# Patient Record
Sex: FEMALE | Race: WHITE | NOT HISPANIC OR LATINO | Employment: FULL TIME | URBAN - METROPOLITAN AREA
[De-identification: names, ages, dates, MRNs, and addresses within clinical notes are randomized per-mention and may not be internally consistent; named-entity substitution may affect disease eponyms.]

---

## 2023-06-23 DIAGNOSIS — F41.9 ANXIETY: ICD-10-CM

## 2023-06-23 PROBLEM — K21.9 GERD (GASTROESOPHAGEAL REFLUX DISEASE): Status: ACTIVE | Noted: 2023-06-23

## 2023-06-23 PROBLEM — E78.00 ELEVATED LDL CHOLESTEROL LEVEL: Status: ACTIVE | Noted: 2023-06-23

## 2023-06-23 PROBLEM — E55.9 VITAMIN D DEFICIENCY: Status: ACTIVE | Noted: 2023-06-23

## 2023-06-23 PROBLEM — K11.7 EXCESSIVE SALIVATION: Status: ACTIVE | Noted: 2023-06-23

## 2023-06-23 PROBLEM — M54.2 NECK PAIN: Status: ACTIVE | Noted: 2023-06-23

## 2023-06-23 PROBLEM — N39.0 ACUTE UTI: Status: ACTIVE | Noted: 2023-06-23

## 2023-06-23 RX ORDER — LEVONORGESTREL AND ETHINYL ESTRADIOL 0.1-0.02MG
1 KIT ORAL DAILY
COMMUNITY
End: 2023-10-24 | Stop reason: ALTCHOICE

## 2023-06-23 RX ORDER — ESCITALOPRAM OXALATE 10 MG/1
10 TABLET ORAL DAILY
COMMUNITY
End: 2023-06-23 | Stop reason: SDUPTHER

## 2023-06-23 RX ORDER — CYCLOBENZAPRINE HCL 5 MG
TABLET ORAL
COMMUNITY
Start: 2022-08-10 | End: 2024-03-19

## 2023-06-23 RX ORDER — CHOLECALCIFEROL (VITAMIN D3) 25 MCG
1 TABLET ORAL DAILY
COMMUNITY
Start: 2022-07-06

## 2023-06-23 RX ORDER — ESCITALOPRAM OXALATE 10 MG/1
10 TABLET ORAL DAILY
Qty: 30 TABLET | Refills: 0 | Status: SHIPPED | OUTPATIENT
Start: 2023-06-23 | End: 2023-08-10 | Stop reason: SDUPTHER

## 2023-08-09 ENCOUNTER — TELEPHONE (OUTPATIENT)
Dept: PRIMARY CARE | Facility: CLINIC | Age: 53
End: 2023-08-09
Payer: COMMERCIAL

## 2023-08-10 DIAGNOSIS — F41.9 ANXIETY: ICD-10-CM

## 2023-08-10 RX ORDER — ESCITALOPRAM OXALATE 10 MG/1
10 TABLET ORAL DAILY
Qty: 90 TABLET | Refills: 1 | Status: SHIPPED | OUTPATIENT
Start: 2023-08-10 | End: 2023-11-20 | Stop reason: SDUPTHER

## 2023-08-11 DIAGNOSIS — Z00.00 HEALTH MAINTENANCE EXAMINATION: ICD-10-CM

## 2023-10-13 ENCOUNTER — LAB (OUTPATIENT)
Dept: LAB | Facility: LAB | Age: 53
End: 2023-10-13
Payer: COMMERCIAL

## 2023-10-13 DIAGNOSIS — Z00.00 HEALTH MAINTENANCE EXAMINATION: ICD-10-CM

## 2023-10-13 PROCEDURE — 80053 COMPREHEN METABOLIC PANEL: CPT

## 2023-10-13 PROCEDURE — 80061 LIPID PANEL: CPT

## 2023-10-13 PROCEDURE — 84443 ASSAY THYROID STIM HORMONE: CPT

## 2023-10-13 PROCEDURE — 36415 COLL VENOUS BLD VENIPUNCTURE: CPT

## 2023-10-13 PROCEDURE — 85025 COMPLETE CBC W/AUTO DIFF WBC: CPT

## 2023-10-14 LAB
ALBUMIN SERPL BCP-MCNC: 4.3 G/DL (ref 3.4–5)
ALP SERPL-CCNC: 70 U/L (ref 33–110)
ALT SERPL W P-5'-P-CCNC: 16 U/L (ref 7–45)
ANION GAP SERPL CALC-SCNC: 14 MMOL/L (ref 10–20)
AST SERPL W P-5'-P-CCNC: 15 U/L (ref 9–39)
BASOPHILS # BLD AUTO: 0.09 X10*3/UL (ref 0–0.1)
BASOPHILS NFR BLD AUTO: 1.2 %
BILIRUB SERPL-MCNC: 0.4 MG/DL (ref 0–1.2)
BUN SERPL-MCNC: 11 MG/DL (ref 6–23)
CALCIUM SERPL-MCNC: 9.4 MG/DL (ref 8.6–10.6)
CHLORIDE SERPL-SCNC: 106 MMOL/L (ref 98–107)
CHOLEST SERPL-MCNC: 216 MG/DL (ref 0–199)
CHOLESTEROL/HDL RATIO: 6.3
CO2 SERPL-SCNC: 25 MMOL/L (ref 21–32)
CREAT SERPL-MCNC: 0.71 MG/DL (ref 0.5–1.05)
EOSINOPHIL # BLD AUTO: 0.21 X10*3/UL (ref 0–0.7)
EOSINOPHIL NFR BLD AUTO: 2.9 %
ERYTHROCYTE [DISTWIDTH] IN BLOOD BY AUTOMATED COUNT: 13 % (ref 11.5–14.5)
GFR SERPL CREATININE-BSD FRML MDRD: >90 ML/MIN/1.73M*2
GLUCOSE SERPL-MCNC: 88 MG/DL (ref 74–99)
HCT VFR BLD AUTO: 46.1 % (ref 36–46)
HDLC SERPL-MCNC: 34.3 MG/DL
HGB BLD-MCNC: 14.6 G/DL (ref 12–16)
IMM GRANULOCYTES # BLD AUTO: 0.02 X10*3/UL (ref 0–0.7)
IMM GRANULOCYTES NFR BLD AUTO: 0.3 % (ref 0–0.9)
LDLC SERPL CALC-MCNC: 135 MG/DL
LYMPHOCYTES # BLD AUTO: 1.54 X10*3/UL (ref 1.2–4.8)
LYMPHOCYTES NFR BLD AUTO: 21.1 %
MCH RBC QN AUTO: 29.9 PG (ref 26–34)
MCHC RBC AUTO-ENTMCNC: 31.7 G/DL (ref 32–36)
MCV RBC AUTO: 94 FL (ref 80–100)
MONOCYTES # BLD AUTO: 0.4 X10*3/UL (ref 0.1–1)
MONOCYTES NFR BLD AUTO: 5.5 %
NEUTROPHILS # BLD AUTO: 5.05 X10*3/UL (ref 1.2–7.7)
NEUTROPHILS NFR BLD AUTO: 69 %
NON HDL CHOLESTEROL: 182 MG/DL (ref 0–149)
NRBC BLD-RTO: 0 /100 WBCS (ref 0–0)
PLATELET # BLD AUTO: 395 X10*3/UL (ref 150–450)
PMV BLD AUTO: 10.2 FL (ref 7.5–11.5)
POTASSIUM SERPL-SCNC: 4.4 MMOL/L (ref 3.5–5.3)
PROT SERPL-MCNC: 7.1 G/DL (ref 6.4–8.2)
RBC # BLD AUTO: 4.89 X10*6/UL (ref 4–5.2)
SODIUM SERPL-SCNC: 141 MMOL/L (ref 136–145)
TRIGL SERPL-MCNC: 236 MG/DL (ref 0–149)
TSH SERPL-ACNC: 0.77 MIU/L (ref 0.44–3.98)
VLDL: 47 MG/DL (ref 0–40)
WBC # BLD AUTO: 7.3 X10*3/UL (ref 4.4–11.3)

## 2023-10-24 ENCOUNTER — OFFICE VISIT (OUTPATIENT)
Dept: PRIMARY CARE | Facility: CLINIC | Age: 53
End: 2023-10-24
Payer: COMMERCIAL

## 2023-10-24 VITALS
SYSTOLIC BLOOD PRESSURE: 144 MMHG | TEMPERATURE: 97.2 F | HEART RATE: 73 BPM | WEIGHT: 192 LBS | DIASTOLIC BLOOD PRESSURE: 98 MMHG | HEIGHT: 65 IN | BODY MASS INDEX: 31.99 KG/M2 | OXYGEN SATURATION: 97 %

## 2023-10-24 DIAGNOSIS — Z00.00 ENCOUNTER FOR ROUTINE HISTORY AND PHYSICAL EXAM IN FEMALE: Primary | ICD-10-CM

## 2023-10-24 DIAGNOSIS — K21.9 GASTROESOPHAGEAL REFLUX DISEASE, UNSPECIFIED WHETHER ESOPHAGITIS PRESENT: ICD-10-CM

## 2023-10-24 DIAGNOSIS — E78.2 MIXED HYPERLIPIDEMIA: ICD-10-CM

## 2023-10-24 DIAGNOSIS — G45.3 AMAUROSIS FUGAX OF LEFT EYE: ICD-10-CM

## 2023-10-24 DIAGNOSIS — Z00.00 ROUTINE ADULT HEALTH MAINTENANCE: ICD-10-CM

## 2023-10-24 DIAGNOSIS — F41.9 ANXIETY: ICD-10-CM

## 2023-10-24 DIAGNOSIS — I10 PRIMARY HYPERTENSION: ICD-10-CM

## 2023-10-24 PROBLEM — M54.2 NECK PAIN: Status: RESOLVED | Noted: 2023-06-23 | Resolved: 2023-10-24

## 2023-10-24 PROBLEM — N39.0 ACUTE UTI: Status: RESOLVED | Noted: 2023-06-23 | Resolved: 2023-10-24

## 2023-10-24 PROBLEM — E55.9 VITAMIN D DEFICIENCY: Status: RESOLVED | Noted: 2023-06-23 | Resolved: 2023-10-24

## 2023-10-24 PROBLEM — E78.00 ELEVATED LDL CHOLESTEROL LEVEL: Status: RESOLVED | Noted: 2023-06-23 | Resolved: 2023-10-24

## 2023-10-24 PROCEDURE — 1036F TOBACCO NON-USER: CPT | Performed by: FAMILY MEDICINE

## 2023-10-24 PROCEDURE — 3080F DIAST BP >= 90 MM HG: CPT | Performed by: FAMILY MEDICINE

## 2023-10-24 PROCEDURE — 99396 PREV VISIT EST AGE 40-64: CPT | Performed by: FAMILY MEDICINE

## 2023-10-24 PROCEDURE — 93000 ELECTROCARDIOGRAM COMPLETE: CPT | Performed by: FAMILY MEDICINE

## 2023-10-24 PROCEDURE — 3077F SYST BP >= 140 MM HG: CPT | Performed by: FAMILY MEDICINE

## 2023-10-24 RX ORDER — LOSARTAN POTASSIUM 50 MG/1
50 TABLET ORAL DAILY
Qty: 30 TABLET | Refills: 0 | Status: SHIPPED | OUTPATIENT
Start: 2023-10-24 | End: 2023-11-20 | Stop reason: SDUPTHER

## 2023-10-24 RX ORDER — ASPIRIN 81 MG/1
81 TABLET ORAL DAILY
Qty: 90 TABLET | Refills: 3 | Status: SHIPPED | OUTPATIENT
Start: 2023-10-24 | End: 2024-10-23

## 2023-10-24 ASSESSMENT — ENCOUNTER SYMPTOMS
COUGH: 0
FREQUENCY: 0
HEADACHES: 0
PSYCHIATRIC NEGATIVE: 1
JOINT SWELLING: 0
SHORTNESS OF BREATH: 0
RHINORRHEA: 0
ABDOMINAL PAIN: 0
COLOR CHANGE: 0
CONFUSION: 0
FEVER: 0
DYSPHORIC MOOD: 0
EYE PAIN: 0
NUMBNESS: 0
FATIGUE: 0
ARTHRALGIAS: 0
TROUBLE SWALLOWING: 0
LOSS OF SENSATION IN FEET: 0
NEUROLOGICAL NEGATIVE: 1
SORE THROAT: 0
CARDIOVASCULAR NEGATIVE: 1
POLYDIPSIA: 0
SLEEP DISTURBANCE: 0
CHILLS: 0
NERVOUS/ANXIOUS: 0
SEIZURES: 0
ROS GI COMMENTS: GE REFLUX
ABDOMINAL DISTENTION: 0
TREMORS: 0
NAUSEA: 0
DECREASED CONCENTRATION: 0
ACTIVITY CHANGE: 0
VOMITING: 0
DIAPHORESIS: 0
CHEST TIGHTNESS: 0
OCCASIONAL FEELINGS OF UNSTEADINESS: 0
MYALGIAS: 0
BACK PAIN: 0
FACIAL ASYMMETRY: 0
CHOKING: 0
NECK PAIN: 0
APPETITE CHANGE: 0
DIFFICULTY URINATING: 0
RESPIRATORY NEGATIVE: 1
CONSTIPATION: 0
BRUISES/BLEEDS EASILY: 0
EYE DISCHARGE: 0
SINUS PAIN: 0
BLOOD IN STOOL: 0
SINUS PRESSURE: 0
DEPRESSION: 0
VOICE CHANGE: 1
PHOTOPHOBIA: 0
AGITATION: 0
APNEA: 0
DIARRHEA: 0
ADENOPATHY: 0
EYE REDNESS: 0
EYE ITCHING: 0
DIZZINESS: 0

## 2023-10-24 ASSESSMENT — PATIENT HEALTH QUESTIONNAIRE - PHQ9
SUM OF ALL RESPONSES TO PHQ9 QUESTIONS 1 AND 2: 0
2. FEELING DOWN, DEPRESSED OR HOPELESS: NOT AT ALL
1. LITTLE INTEREST OR PLEASURE IN DOING THINGS: NOT AT ALL
10. IF YOU CHECKED OFF ANY PROBLEMS, HOW DIFFICULT HAVE THESE PROBLEMS MADE IT FOR YOU TO DO YOUR WORK, TAKE CARE OF THINGS AT HOME, OR GET ALONG WITH OTHER PEOPLE: NOT DIFFICULT AT ALL

## 2023-10-24 ASSESSMENT — COLUMBIA-SUICIDE SEVERITY RATING SCALE - C-SSRS
6. HAVE YOU EVER DONE ANYTHING, STARTED TO DO ANYTHING, OR PREPARED TO DO ANYTHING TO END YOUR LIFE?: NO
1. IN THE PAST MONTH, HAVE YOU WISHED YOU WERE DEAD OR WISHED YOU COULD GO TO SLEEP AND NOT WAKE UP?: NO
2. HAVE YOU ACTUALLY HAD ANY THOUGHTS OF KILLING YOURSELF?: NO

## 2023-10-24 NOTE — ASSESSMENT & PLAN NOTE
Going to obtain a copy of the optometry notes.    Starting aspirin 81 mg daily.  Starting losartan therapy.  We will likely need to add cholesterol medicine.  Going to follow cholesterol-lowering diet regimen.  We will recheck these levels in 3 months    Testing going to be performed.  Carotid Dopplers going to be performed.    7-day monitor going to be performed an echocardiogram.    If any recurrence of symptoms please call day or night.

## 2023-10-24 NOTE — PATIENT INSTRUCTIONS
Blood pressure is elevated today.    Starting losartan therapy.  Please keep blood pressure diary twice weekly for the next 4 weeks.    I would like to have follow-up in 4 weeks time.    Going obtain a copy of the notes from your optometrist.    Carotid Dopplers, echocardiogram and 7-day monitor going to be performed.  Please follow type II diet to try to lower cholesterol.  Going to recheck cholesterol in 3 months if LDL remains elevated would likely start cholesterol-lowering medicine.  Please start aspirin 81 mg daily.    Because of elevation of cholesterol levels going to check CT scoring of the coronary arteries.

## 2023-10-24 NOTE — PROGRESS NOTES
Subjective   Patient ID: Cait Wallace is a 53 y.o. female who presents for Annual Exam.    Patient presents for physical exam.    Overall doing well.  She is up-to-date with her colon cancer screening and female wellness.    Patient has had some elevation of her blood pressure readings.  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  She has had no troubles with headache no chest pain no shortness of breath no dizziness no lightheadedness.    She had noted some visual disturbance she feels that there is a very vibrant and bright C in the left visual field.  She did see her optometrist.    They felt that she should have a carotid Dopplers performed.    She had no numbness no tingling the legs or feet she not been off balance no other signs or symptoms of stroke.  The episode lasted about 6 to 10 minutes.    She has had this 3 times in about the last 3 years.    She has had no troubles with nausea no vomiting.  Does get some phlegm from GE reflux.She has had no blood in the stool or black tarry stool.  No troubles with swelling of the legs or feet no numbness no tingling the legs or feet.  No postmenopausal bleeding no rectal bleeding.     No issues ,  Less tan 10 minutes.    Always L side.  Lasted 10 min.  Multiple TIA.    Alcohol intake: 1 drink at night wine  Caffeine intake: 2 cups of coffee  Exercise: walking    Last Colonoscopy: 2021  Last Pap smear: utd  Mammogram:utd  Last Dexa scan:N/A    Shingles vaccine: refused  TdaP vaccine:     Review of Systems   Constitutional:  Negative for activity change, appetite change, chills, diaphoresis, fatigue and fever.   HENT:  Positive for voice change. Negative for congestion, dental problem, drooling, ear discharge, ear pain, hearing loss, nosebleeds, rhinorrhea, sinus pressure, sinus pain, sneezing, sore throat, tinnitus and trouble swallowing.    Eyes:  Negative for photophobia, pain, discharge, redness, itching and visual  "disturbance.   Respiratory: Negative.  Negative for apnea, cough, choking, chest tightness and shortness of breath.    Cardiovascular: Negative.  Negative for chest pain and leg swelling.   Gastrointestinal:  Negative for abdominal distention, abdominal pain, blood in stool, constipation, diarrhea, nausea and vomiting.        GE reflux   Endocrine: Negative for cold intolerance, heat intolerance, polydipsia and polyuria.   Genitourinary:  Negative for difficulty urinating, enuresis and frequency.   Musculoskeletal:  Negative for arthralgias, back pain, joint swelling, myalgias and neck pain.   Skin:  Negative for color change and rash.   Allergic/Immunologic: Negative for environmental allergies and food allergies.   Neurological: Negative.  Negative for dizziness, tremors, seizures, syncope, facial asymmetry, numbness and headaches.   Hematological:  Negative for adenopathy. Does not bruise/bleed easily.   Psychiatric/Behavioral: Negative.  Negative for agitation, behavioral problems, confusion, decreased concentration, dysphoric mood, sleep disturbance and suicidal ideas. The patient is not nervous/anxious.        Objective   BP (!) 144/98   Pulse 73   Temp 36.2 °C (97.2 °F)   Ht 1.638 m (5' 4.5\")   Wt 87.1 kg (192 lb)   LMP 10/19/2023 (Exact Date)   SpO2 97%   BMI 32.45 kg/m²   BSA Body surface area is 1.99 meters squared.      Physical Exam  Constitutional:       General: She is not in acute distress.     Appearance: Normal appearance. She is normal weight. She is not ill-appearing or diaphoretic.   HENT:      Head: Normocephalic.      Right Ear: Tympanic membrane, ear canal and external ear normal. There is no impacted cerumen.      Left Ear: Tympanic membrane, ear canal and external ear normal. There is no impacted cerumen.      Nose: No congestion or rhinorrhea.      Mouth/Throat:      Mouth: Mucous membranes are moist.      Pharynx: No oropharyngeal exudate or posterior oropharyngeal erythema.   Eyes: "      Conjunctiva/sclera: Conjunctivae normal.      Pupils: Pupils are equal, round, and reactive to light.   Neck:      Vascular: No carotid bruit.   Cardiovascular:      Rate and Rhythm: Normal rate and regular rhythm.      Pulses: Normal pulses.      Heart sounds: No murmur heard.     No friction rub.   Pulmonary:      Effort: Pulmonary effort is normal. No respiratory distress.      Breath sounds: No stridor.   Abdominal:      General: Abdomen is flat. There is no distension.      Tenderness: There is no abdominal tenderness. There is no guarding.   Musculoskeletal:         General: No swelling or tenderness.      Right lower leg: No edema.      Left lower leg: No edema.   Lymphadenopathy:      Cervical: No cervical adenopathy.   Skin:     General: Skin is warm.      Coloration: Skin is not pale.      Findings: No rash.   Neurological:      Cranial Nerves: No cranial nerve deficit.      Sensory: No sensory deficit.      Motor: No weakness.      Coordination: Coordination normal.   Psychiatric:         Mood and Affect: Mood normal.         Behavior: Behavior normal.         Thought Content: Thought content normal.         Judgment: Judgment normal.     Carotids revealed no bruit.    Heart reveals no murmur.    No irregular heart rhythm  Lab on 10/13/2023   Component Date Value Ref Range Status    WBC 10/13/2023 7.3  4.4 - 11.3 x10*3/uL Final    nRBC 10/13/2023 0.0  0.0 - 0.0 /100 WBCs Final    RBC 10/13/2023 4.89  4.00 - 5.20 x10*6/uL Final    Hemoglobin 10/13/2023 14.6  12.0 - 16.0 g/dL Final    Hematocrit 10/13/2023 46.1 (H)  36.0 - 46.0 % Final    MCV 10/13/2023 94  80 - 100 fL Final    MCH 10/13/2023 29.9  26.0 - 34.0 pg Final    MCHC 10/13/2023 31.7 (L)  32.0 - 36.0 g/dL Final    RDW 10/13/2023 13.0  11.5 - 14.5 % Final    Platelets 10/13/2023 395  150 - 450 x10*3/uL Final    MPV 10/13/2023 10.2  7.5 - 11.5 fL Final    Neutrophils % 10/13/2023 69.0  40.0 - 80.0 % Final    Immature Granulocytes %, Automated  10/13/2023 0.3  0.0 - 0.9 % Final    Immature Granulocyte Count (IG) includes promyelocytes, myelocytes and metamyelocytes but does not include bands. Percent differential counts (%) should be interpreted in the context of the absolute cell counts (cells/UL).    Lymphocytes % 10/13/2023 21.1  13.0 - 44.0 % Final    Monocytes % 10/13/2023 5.5  2.0 - 10.0 % Final    Eosinophils % 10/13/2023 2.9  0.0 - 6.0 % Final    Basophils % 10/13/2023 1.2  0.0 - 2.0 % Final    Neutrophils Absolute 10/13/2023 5.05  1.20 - 7.70 x10*3/uL Final    Percent differential counts (%) should be interpreted in the context of the absolute cell counts (cells/uL).    Immature Granulocytes Absolute, Au* 10/13/2023 0.02  0.00 - 0.70 x10*3/uL Final    Lymphocytes Absolute 10/13/2023 1.54  1.20 - 4.80 x10*3/uL Final    Monocytes Absolute 10/13/2023 0.40  0.10 - 1.00 x10*3/uL Final    Eosinophils Absolute 10/13/2023 0.21  0.00 - 0.70 x10*3/uL Final    Basophils Absolute 10/13/2023 0.09  0.00 - 0.10 x10*3/uL Final    Glucose 10/13/2023 88  74 - 99 mg/dL Final    Sodium 10/13/2023 141  136 - 145 mmol/L Final    Potassium 10/13/2023 4.4  3.5 - 5.3 mmol/L Final    Chloride 10/13/2023 106  98 - 107 mmol/L Final    Bicarbonate 10/13/2023 25  21 - 32 mmol/L Final    Anion Gap 10/13/2023 14  10 - 20 mmol/L Final    Urea Nitrogen 10/13/2023 11  6 - 23 mg/dL Final    Creatinine 10/13/2023 0.71  0.50 - 1.05 mg/dL Final    eGFR 10/13/2023 >90  >60 mL/min/1.73m*2 Final    Calculations of estimated GFR are performed using the 2021 CKD-EPI Study Refit equation without the race variable for the IDMS-Traceable creatinine methods.  https://jasn.asnjournals.org/content/early/2021/09/22/ASN.4002027624    Calcium 10/13/2023 9.4  8.6 - 10.6 mg/dL Final    Albumin 10/13/2023 4.3  3.4 - 5.0 g/dL Final    Alkaline Phosphatase 10/13/2023 70  33 - 110 U/L Final    Total Protein 10/13/2023 7.1  6.4 - 8.2 g/dL Final    AST 10/13/2023 15  9 - 39 U/L Final    Bilirubin, Total  10/13/2023 0.4  0.0 - 1.2 mg/dL Final    ALT 10/13/2023 16  7 - 45 U/L Final    Patients treated with Sulfasalazine may generate falsely decreased results for ALT.    Cholesterol 10/13/2023 216 (H)  0 - 199 mg/dL Final          Age      Desirable   Borderline High   High     0-19 Y     0 - 169       170 - 199     >/= 200    20-24 Y     0 - 189       190 - 224     >/= 225         >24 Y     0 - 199       200 - 239     >/= 240   **All ranges are based on fasting samples. Specific   therapeutic targets will vary based on patient-specific   cardiac risk.    Pediatric guidelines reference:Pediatrics 2011, 128(S5).Adult guidelines reference: NCEP ATPIII Guidelines,OPAL 2001, 258:2486-97    Venipuncture immediately after or during the administration of Metamizole may lead to falsely low results. Testing should be performed immediately prior to Metamizole dosing.    HDL-Cholesterol 10/13/2023 34.3  mg/dL Final      Age       Very Low   Low     Normal    High    0-19 Y    < 35      < 40     40-45     ----  20-24 Y    ----     < 40      >45      ----        >24 Y      ----     < 40     40-60      >60      Cholesterol/HDL Ratio 10/13/2023 6.3   Final      Ref Values  Desirable  < 3.4  High Risk  > 5.0    LDL Calculated 10/13/2023 135 (H)  <=99 mg/dL Final                                Near   Borderline      AGE      Desirable  Optimal    High     High     Very High     0-19 Y     0 - 109     ---    110-129   >/= 130     ----    20-24 Y     0 - 119     ---    120-159   >/= 160     ----      >24 Y     0 -  99   100-129  130-159   160-189     >/=190      VLDL 10/13/2023 47 (H)  0 - 40 mg/dL Final    Triglycerides 10/13/2023 236 (H)  0 - 149 mg/dL Final       Age         Desirable   Borderline High   High     Very High   0 D-90 D    19 - 174         ----         ----        ----  91 D- 9 Y     0 -  74        75 -  99     >/= 100      ----    10-19 Y     0 -  89        90 - 129     >/= 130      ----    20-24 Y     0 - 114       115  - 149     >/= 150      ----         >24 Y     0 - 149       150 - 199    200- 499    >/= 500    Venipuncture immediately after or during the administration of Metamizole may lead to falsely low results. Testing should be performed immediately prior to Metamizole dosing.    Non HDL Cholesterol 10/13/2023 182 (H)  0 - 149 mg/dL Final          Age       Desirable   Borderline High   High     Very High     0-19 Y     0 - 119       120 - 144     >/= 145    >/= 160    20-24 Y     0 - 149       150 - 189     >/= 190      ----         >24 Y    30 mg/dL above LDL Cholesterol goal      Thyroid Stimulating Hormone 10/13/2023 0.77  0.44 - 3.98 mIU/L Final     Current Outpatient Medications on File Prior to Visit   Medication Sig Dispense Refill    cholecalciferol (Vitamin D-3) 25 MCG (1000 UT) tablet Take 1 tablet (25 mcg) by mouth once daily.      escitalopram (Lexapro) 10 mg tablet Take 1 tablet (10 mg) by mouth once daily. Patient needs follow-up appointment to be seen 90 tablet 1    cyclobenzaprine (Flexeril) 5 mg tablet Take by mouth.      [DISCONTINUED] Lessina 0.1-20 mg-mcg tablet Take 1 tablet by mouth once daily.       No current facility-administered medications on file prior to visit.     No images are attached to the encounter.            Assessment/Plan   Problem List Items Addressed This Visit             ICD-10-CM    Anxiety F41.9     Doing well.         GERD (gastroesophageal reflux disease) K21.9     As we discussed may use famotidine to help with reflux.  We have seen the GI specialist         Primary hypertension I10     Blood pressure not well controlled adding losartan therapy.  Would like to follow-up in 4 weeks         Encounter for routine history and physical exam in female - Primary Z00.00    Amaurosis fugax of left eye G45.3     Going to obtain a copy of the optometry notes.    Starting aspirin 81 mg daily.  Starting losartan therapy.  We will likely need to add cholesterol medicine.  Going to follow  cholesterol-lowering diet regimen.  We will recheck these levels in 3 months    Testing going to be performed.  Carotid Dopplers going to be performed.    7-day monitor going to be performed an echocardiogram.    If any recurrence of symptoms please call day or night.         Mixed hyperlipidemia E78.2

## 2023-11-07 ENCOUNTER — HOSPITAL ENCOUNTER (OUTPATIENT)
Dept: CARDIOLOGY | Facility: CLINIC | Age: 53
Discharge: HOME | End: 2023-11-07
Payer: COMMERCIAL

## 2023-11-07 DIAGNOSIS — G45.3 AMAUROSIS FUGAX OF LEFT EYE: ICD-10-CM

## 2023-11-07 PROCEDURE — 93306 TTE W/DOPPLER COMPLETE: CPT

## 2023-11-07 PROCEDURE — 93306 TTE W/DOPPLER COMPLETE: CPT | Performed by: STUDENT IN AN ORGANIZED HEALTH CARE EDUCATION/TRAINING PROGRAM

## 2023-11-09 LAB
EJECTION FRACTION APICAL 4 CHAMBER: 64.8
EJECTION FRACTION: 55
LEFT ATRIUM VOLUME AREA LENGTH INDEX BSA: 28
LEFT VENTRICULAR OUTFLOW TRACT DIAMETER: 2
MITRAL VALVE E/A RATIO: 0.84
MITRAL VALVE E/E' RATIO: 6.24
RIGHT VENTRICLE FREE WALL PEAK S': 9.95
TRICUSPID ANNULAR PLANE SYSTOLIC EXCURSION: 2.4

## 2023-11-20 ENCOUNTER — OFFICE VISIT (OUTPATIENT)
Dept: PRIMARY CARE | Facility: CLINIC | Age: 53
End: 2023-11-20
Payer: COMMERCIAL

## 2023-11-20 VITALS
OXYGEN SATURATION: 98 % | DIASTOLIC BLOOD PRESSURE: 78 MMHG | BODY MASS INDEX: 31.99 KG/M2 | WEIGHT: 192 LBS | HEIGHT: 65 IN | TEMPERATURE: 97.1 F | SYSTOLIC BLOOD PRESSURE: 122 MMHG | HEART RATE: 73 BPM

## 2023-11-20 DIAGNOSIS — Q24.8 INTERATRIAL CARDIAC SHUNT (HHS-HCC): Primary | ICD-10-CM

## 2023-11-20 DIAGNOSIS — F41.9 ANXIETY: ICD-10-CM

## 2023-11-20 DIAGNOSIS — I10 PRIMARY HYPERTENSION: ICD-10-CM

## 2023-11-20 PROCEDURE — 3078F DIAST BP <80 MM HG: CPT | Performed by: FAMILY MEDICINE

## 2023-11-20 PROCEDURE — 99213 OFFICE O/P EST LOW 20 MIN: CPT | Performed by: FAMILY MEDICINE

## 2023-11-20 PROCEDURE — 1036F TOBACCO NON-USER: CPT | Performed by: FAMILY MEDICINE

## 2023-11-20 PROCEDURE — 3074F SYST BP LT 130 MM HG: CPT | Performed by: FAMILY MEDICINE

## 2023-11-20 RX ORDER — ESCITALOPRAM OXALATE 10 MG/1
10 TABLET ORAL DAILY
Qty: 90 TABLET | Refills: 1 | Status: SHIPPED | OUTPATIENT
Start: 2023-11-20 | End: 2024-03-19 | Stop reason: SDUPTHER

## 2023-11-20 RX ORDER — LOSARTAN POTASSIUM 50 MG/1
50 TABLET ORAL DAILY
Qty: 30 TABLET | Refills: 0 | Status: SHIPPED | OUTPATIENT
Start: 2023-11-20 | End: 2023-12-18 | Stop reason: SDUPTHER

## 2023-11-20 ASSESSMENT — PATIENT HEALTH QUESTIONNAIRE - PHQ9
1. LITTLE INTEREST OR PLEASURE IN DOING THINGS: NOT AT ALL
2. FEELING DOWN, DEPRESSED OR HOPELESS: NOT AT ALL
SUM OF ALL RESPONSES TO PHQ9 QUESTIONS 1 AND 2: 0
10. IF YOU CHECKED OFF ANY PROBLEMS, HOW DIFFICULT HAVE THESE PROBLEMS MADE IT FOR YOU TO DO YOUR WORK, TAKE CARE OF THINGS AT HOME, OR GET ALONG WITH OTHER PEOPLE: NOT DIFFICULT AT ALL

## 2023-11-20 ASSESSMENT — ENCOUNTER SYMPTOMS
CARDIOVASCULAR NEGATIVE: 1
MUSCULOSKELETAL NEGATIVE: 1
OCCASIONAL FEELINGS OF UNSTEADINESS: 0
RESPIRATORY NEGATIVE: 1
DEPRESSION: 0
LOSS OF SENSATION IN FEET: 0
CONSTITUTIONAL NEGATIVE: 1
ENDOCRINE NEGATIVE: 1

## 2023-11-20 NOTE — ASSESSMENT & PLAN NOTE
Blood pressure improved would like to see the systolic or the upper number stable below 135 and the bottom number or diastolic stay below 85.    Going to have you continue with blood pressure diary.  Continue on losartan therapy.  BMP going to be performed

## 2023-11-20 NOTE — PATIENT INSTRUCTIONS
Recommend following up with cardiology.  You do have upcoming appointment but it is delayed because of wanting to see Dr. Cervantes.  I would recommend seeing any of the docs in that group.    If troubles getting appointment please call and let me know.    I am doing echocardiogram with bubble study because of possible intra atrial shunting.    BMP has been ordered to monitor kidney function test and potassium level.    Blood pressure has improved would like to continue present regimen keep blood pressure diary 3 times a week and would like to check this in 4 weeks.    If the top number on blood pressure stays above 140 or the bottom number above 90 on a regular basis, please call and let me know.

## 2023-11-20 NOTE — PROGRESS NOTES
"Subjective   Patient ID: Cait Wallace is a 53 y.o. female who presents for Follow-up (BP).    Doing better .  Patient doing better.  Still having some blood pressure readings above 85 at home.  These are the diastolic readings.    Patient's been trying to walk more.  Trying to watch diet more closely.  No troubles with chest pain or shortness of breath no neurologic symptoms at all.  Patient had echocardiogram performed which showed a possible intra-atrial shunting.    No troubles with swelling her legs    Walking a lot.  At home up and down steps.             Review of Systems   Constitutional: Negative.    Respiratory: Negative.     Cardiovascular: Negative.    Endocrine: Negative.    Genitourinary: Negative.    Musculoskeletal: Negative.        Objective   /78   Pulse 73   Temp 36.2 °C (97.1 °F)   Ht 1.638 m (5' 4.5\")   Wt 87.1 kg (192 lb)   LMP 10/19/2023 (Exact Date)   SpO2 98%   BMI 32.45 kg/m²   BSA Body surface area is 1.99 meters squared.      Physical Exam  HENT:      Head: Normocephalic.      Nose: No congestion or rhinorrhea.   Cardiovascular:      Rate and Rhythm: Normal rate and regular rhythm.      Heart sounds: No murmur heard.     No friction rub. No gallop.   Pulmonary:      Effort: Pulmonary effort is normal.      Breath sounds: Normal breath sounds.   Musculoskeletal:         General: No swelling or deformity.   Neurological:      Mental Status: She is alert.       Hospital Outpatient Visit on 11/07/2023   Component Date Value Ref Range Status    LVOT diam 11/07/2023 2.00   Final    LV biplane EF 11/07/2023 55   Final    MV avg E/e' ratio 11/07/2023 6.24   Final    MV E/A ratio 11/07/2023 0.84   Final    LA vol index A/L 11/07/2023 28.0   Final    Tricuspid annular plane systolic e* 11/07/2023 2.4   Final    RV free wall pk S' 11/07/2023 9.95   Final    LV A4C EF 11/07/2023 64.8   Final   Lab on 10/13/2023   Component Date Value Ref Range Status    WBC 10/13/2023 7.3  4.4 - 11.3 " x10*3/uL Final    nRBC 10/13/2023 0.0  0.0 - 0.0 /100 WBCs Final    RBC 10/13/2023 4.89  4.00 - 5.20 x10*6/uL Final    Hemoglobin 10/13/2023 14.6  12.0 - 16.0 g/dL Final    Hematocrit 10/13/2023 46.1 (H)  36.0 - 46.0 % Final    MCV 10/13/2023 94  80 - 100 fL Final    MCH 10/13/2023 29.9  26.0 - 34.0 pg Final    MCHC 10/13/2023 31.7 (L)  32.0 - 36.0 g/dL Final    RDW 10/13/2023 13.0  11.5 - 14.5 % Final    Platelets 10/13/2023 395  150 - 450 x10*3/uL Final    MPV 10/13/2023 10.2  7.5 - 11.5 fL Final    Neutrophils % 10/13/2023 69.0  40.0 - 80.0 % Final    Immature Granulocytes %, Automated 10/13/2023 0.3  0.0 - 0.9 % Final    Immature Granulocyte Count (IG) includes promyelocytes, myelocytes and metamyelocytes but does not include bands. Percent differential counts (%) should be interpreted in the context of the absolute cell counts (cells/UL).    Lymphocytes % 10/13/2023 21.1  13.0 - 44.0 % Final    Monocytes % 10/13/2023 5.5  2.0 - 10.0 % Final    Eosinophils % 10/13/2023 2.9  0.0 - 6.0 % Final    Basophils % 10/13/2023 1.2  0.0 - 2.0 % Final    Neutrophils Absolute 10/13/2023 5.05  1.20 - 7.70 x10*3/uL Final    Percent differential counts (%) should be interpreted in the context of the absolute cell counts (cells/uL).    Immature Granulocytes Absolute, Au* 10/13/2023 0.02  0.00 - 0.70 x10*3/uL Final    Lymphocytes Absolute 10/13/2023 1.54  1.20 - 4.80 x10*3/uL Final    Monocytes Absolute 10/13/2023 0.40  0.10 - 1.00 x10*3/uL Final    Eosinophils Absolute 10/13/2023 0.21  0.00 - 0.70 x10*3/uL Final    Basophils Absolute 10/13/2023 0.09  0.00 - 0.10 x10*3/uL Final    Glucose 10/13/2023 88  74 - 99 mg/dL Final    Sodium 10/13/2023 141  136 - 145 mmol/L Final    Potassium 10/13/2023 4.4  3.5 - 5.3 mmol/L Final    Chloride 10/13/2023 106  98 - 107 mmol/L Final    Bicarbonate 10/13/2023 25  21 - 32 mmol/L Final    Anion Gap 10/13/2023 14  10 - 20 mmol/L Final    Urea Nitrogen 10/13/2023 11  6 - 23 mg/dL Final     Creatinine 10/13/2023 0.71  0.50 - 1.05 mg/dL Final    eGFR 10/13/2023 >90  >60 mL/min/1.73m*2 Final    Calculations of estimated GFR are performed using the 2021 CKD-EPI Study Refit equation without the race variable for the IDMS-Traceable creatinine methods.  https://jasn.asnjournals.org/content/early/2021/09/22/ASN.4347701339    Calcium 10/13/2023 9.4  8.6 - 10.6 mg/dL Final    Albumin 10/13/2023 4.3  3.4 - 5.0 g/dL Final    Alkaline Phosphatase 10/13/2023 70  33 - 110 U/L Final    Total Protein 10/13/2023 7.1  6.4 - 8.2 g/dL Final    AST 10/13/2023 15  9 - 39 U/L Final    Bilirubin, Total 10/13/2023 0.4  0.0 - 1.2 mg/dL Final    ALT 10/13/2023 16  7 - 45 U/L Final    Patients treated with Sulfasalazine may generate falsely decreased results for ALT.    Cholesterol 10/13/2023 216 (H)  0 - 199 mg/dL Final          Age      Desirable   Borderline High   High     0-19 Y     0 - 169       170 - 199     >/= 200    20-24 Y     0 - 189       190 - 224     >/= 225         >24 Y     0 - 199       200 - 239     >/= 240   **All ranges are based on fasting samples. Specific   therapeutic targets will vary based on patient-specific   cardiac risk.    Pediatric guidelines reference:Pediatrics 2011, 128(S5).Adult guidelines reference: NCEP ATPIII Guidelines,OPAL 2001, 258:2486-97    Venipuncture immediately after or during the administration of Metamizole may lead to falsely low results. Testing should be performed immediately prior to Metamizole dosing.    HDL-Cholesterol 10/13/2023 34.3  mg/dL Final      Age       Very Low   Low     Normal    High    0-19 Y    < 35      < 40     40-45     ----  20-24 Y    ----     < 40      >45      ----        >24 Y      ----     < 40     40-60      >60      Cholesterol/HDL Ratio 10/13/2023 6.3   Final      Ref Values  Desirable  < 3.4  High Risk  > 5.0    LDL Calculated 10/13/2023 135 (H)  <=99 mg/dL Final                                Near   Borderline      AGE      Desirable  Optimal     High     High     Very High     0-19 Y     0 - 109     ---    110-129   >/= 130     ----    20-24 Y     0 - 119     ---    120-159   >/= 160     ----      >24 Y     0 -  99   100-129  130-159   160-189     >/=190      VLDL 10/13/2023 47 (H)  0 - 40 mg/dL Final    Triglycerides 10/13/2023 236 (H)  0 - 149 mg/dL Final       Age         Desirable   Borderline High   High     Very High   0 D-90 D    19 - 174         ----         ----        ----  91 D- 9 Y     0 -  74        75 -  99     >/= 100      ----    10-19 Y     0 -  89        90 - 129     >/= 130      ----    20-24 Y     0 - 114       115 - 149     >/= 150      ----         >24 Y     0 - 149       150 - 199    200- 499    >/= 500    Venipuncture immediately after or during the administration of Metamizole may lead to falsely low results. Testing should be performed immediately prior to Metamizole dosing.    Non HDL Cholesterol 10/13/2023 182 (H)  0 - 149 mg/dL Final          Age       Desirable   Borderline High   High     Very High     0-19 Y     0 - 119       120 - 144     >/= 145    >/= 160    20-24 Y     0 - 149       150 - 189     >/= 190      ----         >24 Y    30 mg/dL above LDL Cholesterol goal      Thyroid Stimulating Hormone 10/13/2023 0.77  0.44 - 3.98 mIU/L Final     Current Outpatient Medications on File Prior to Visit   Medication Sig Dispense Refill    aspirin 81 mg EC tablet Take 1 tablet (81 mg) by mouth once daily. 90 tablet 3    cholecalciferol (Vitamin D-3) 25 MCG (1000 UT) tablet Take 1 tablet (25 mcg) by mouth once daily.      escitalopram (Lexapro) 10 mg tablet Take 1 tablet (10 mg) by mouth once daily. Patient needs follow-up appointment to be seen 90 tablet 1    losartan (Cozaar) 50 mg tablet Take 1 tablet (50 mg) by mouth once daily. 30 tablet 0    cyclobenzaprine (Flexeril) 5 mg tablet Take by mouth.       No current facility-administered medications on file prior to visit.     No images are attached to the encounter.             Assessment/Plan   Problem List Items Addressed This Visit             ICD-10-CM    Anxiety F41.9     Overall doing well.         Primary hypertension I10     Blood pressure improved would like to see the systolic or the upper number stable below 135 and the bottom number or diastolic stay below 85.    Going to have you continue with blood pressure diary.  Continue on losartan therapy.  BMP going to be performed         Interatrial cardiac shunt - Primary Q24.8     Echocardiogram shows questionable intra atrial shunting.  Going to do bubble study.

## 2023-12-15 ENCOUNTER — LAB (OUTPATIENT)
Dept: LAB | Facility: LAB | Age: 53
End: 2023-12-15
Payer: COMMERCIAL

## 2023-12-15 DIAGNOSIS — G45.9 TRANSIENT CEREBRAL ISCHEMIC ATTACK, UNSPECIFIED: Primary | ICD-10-CM

## 2023-12-15 DIAGNOSIS — I10 PRIMARY HYPERTENSION: ICD-10-CM

## 2023-12-15 DIAGNOSIS — Q24.8 INTERATRIAL CARDIAC SHUNT (HHS-HCC): ICD-10-CM

## 2023-12-15 DIAGNOSIS — D68.59 OTHER PRIMARY THROMBOPHILIA (MULTI): ICD-10-CM

## 2023-12-15 LAB
ANION GAP SERPL CALC-SCNC: 11 MMOL/L (ref 10–20)
BUN SERPL-MCNC: 14 MG/DL (ref 6–23)
CALCIUM SERPL-MCNC: 9.7 MG/DL (ref 8.6–10.6)
CHLORIDE SERPL-SCNC: 104 MMOL/L (ref 98–107)
CO2 SERPL-SCNC: 30 MMOL/L (ref 21–32)
CREAT SERPL-MCNC: 0.8 MG/DL (ref 0.5–1.05)
GFR SERPL CREATININE-BSD FRML MDRD: 88 ML/MIN/1.73M*2
GLUCOSE SERPL-MCNC: 86 MG/DL (ref 74–99)
POTASSIUM SERPL-SCNC: 4.3 MMOL/L (ref 3.5–5.3)
SODIUM SERPL-SCNC: 141 MMOL/L (ref 136–145)

## 2023-12-15 PROCEDURE — 36415 COLL VENOUS BLD VENIPUNCTURE: CPT

## 2023-12-15 PROCEDURE — 85730 THROMBOPLASTIN TIME PARTIAL: CPT

## 2023-12-15 PROCEDURE — 85613 RUSSELL VIPER VENOM DILUTED: CPT

## 2023-12-15 PROCEDURE — 85306 CLOT INHIBIT PROT S FREE: CPT

## 2023-12-15 PROCEDURE — 85301 ANTITHROMBIN III ANTIGEN: CPT

## 2023-12-15 PROCEDURE — 85303 CLOT INHIBIT PROT C ACTIVITY: CPT

## 2023-12-15 PROCEDURE — 80048 BASIC METABOLIC PNL TOTAL CA: CPT

## 2023-12-18 ENCOUNTER — OFFICE VISIT (OUTPATIENT)
Dept: PRIMARY CARE | Facility: CLINIC | Age: 53
End: 2023-12-18
Payer: COMMERCIAL

## 2023-12-18 ENCOUNTER — LAB (OUTPATIENT)
Dept: LAB | Facility: LAB | Age: 53
End: 2023-12-18
Payer: COMMERCIAL

## 2023-12-18 VITALS
WEIGHT: 196 LBS | TEMPERATURE: 97.4 F | DIASTOLIC BLOOD PRESSURE: 83 MMHG | HEART RATE: 79 BPM | HEIGHT: 65 IN | SYSTOLIC BLOOD PRESSURE: 122 MMHG | OXYGEN SATURATION: 97 % | BODY MASS INDEX: 32.65 KG/M2

## 2023-12-18 DIAGNOSIS — I10 ESSENTIAL (PRIMARY) HYPERTENSION: Primary | ICD-10-CM

## 2023-12-18 DIAGNOSIS — I10 PRIMARY HYPERTENSION: ICD-10-CM

## 2023-12-18 LAB
ANION GAP SERPL CALC-SCNC: 13 MMOL/L (ref 10–20)
AT III AG ACT/NOR PPP IA: 102 % (ref 82–136)
BUN SERPL-MCNC: 17 MG/DL (ref 6–23)
CALCIUM SERPL-MCNC: 9.6 MG/DL (ref 8.6–10.6)
CHLORIDE SERPL-SCNC: 104 MMOL/L (ref 98–107)
CO2 SERPL-SCNC: 28 MMOL/L (ref 21–32)
CREAT SERPL-MCNC: 0.71 MG/DL (ref 0.5–1.05)
DRVVT SCREEN TO CONFIRM RATIO: 0.94 RATIO
DRVVT/DRVVT CFM NRMLZD PPP-RTO: 0.95 RATIO
DRVVT/DRVVT CFM P DOAC NEUT NORM PPP-RTO: 0.99 RATIO
ERYTHROCYTE [DISTWIDTH] IN BLOOD BY AUTOMATED COUNT: 12.8 % (ref 11.5–14.5)
GFR SERPL CREATININE-BSD FRML MDRD: >90 ML/MIN/1.73M*2
GLUCOSE SERPL-MCNC: 90 MG/DL (ref 74–99)
HCT VFR BLD AUTO: 44.1 % (ref 36–46)
HGB BLD-MCNC: 14.1 G/DL (ref 12–16)
LA 2 SCREEN W REFLEX-IMP: NORMAL
MCH RBC QN AUTO: 29.4 PG (ref 26–34)
MCHC RBC AUTO-ENTMCNC: 32 G/DL (ref 32–36)
MCV RBC AUTO: 92 FL (ref 80–100)
NORMALIZED SCT PPP-RTO: 1.05 RATIO
NRBC BLD-RTO: 0 /100 WBCS (ref 0–0)
PLATELET # BLD AUTO: 355 X10*3/UL (ref 150–450)
POTASSIUM SERPL-SCNC: 4.2 MMOL/L (ref 3.5–5.3)
RBC # BLD AUTO: 4.79 X10*6/UL (ref 4–5.2)
SILICA CLOTTING TIME CONFIRMATION: 0.88 RATIO
SILICA CLOTTING TIME SCREEN: 0.93 RATIO
SODIUM SERPL-SCNC: 141 MMOL/L (ref 136–145)
WBC # BLD AUTO: 5.8 X10*3/UL (ref 4.4–11.3)

## 2023-12-18 PROCEDURE — 80048 BASIC METABOLIC PNL TOTAL CA: CPT

## 2023-12-18 PROCEDURE — 36415 COLL VENOUS BLD VENIPUNCTURE: CPT

## 2023-12-18 PROCEDURE — 3074F SYST BP LT 130 MM HG: CPT | Performed by: FAMILY MEDICINE

## 2023-12-18 PROCEDURE — 85027 COMPLETE CBC AUTOMATED: CPT

## 2023-12-18 PROCEDURE — 1036F TOBACCO NON-USER: CPT | Performed by: FAMILY MEDICINE

## 2023-12-18 PROCEDURE — 99213 OFFICE O/P EST LOW 20 MIN: CPT | Performed by: FAMILY MEDICINE

## 2023-12-18 PROCEDURE — 3079F DIAST BP 80-89 MM HG: CPT | Performed by: FAMILY MEDICINE

## 2023-12-18 RX ORDER — LOSARTAN POTASSIUM 50 MG/1
50 TABLET ORAL DAILY
Qty: 90 TABLET | Refills: 3 | Status: SHIPPED | OUTPATIENT
Start: 2023-12-18 | End: 2024-12-12

## 2023-12-18 ASSESSMENT — ENCOUNTER SYMPTOMS
CARDIOVASCULAR NEGATIVE: 1
OCCASIONAL FEELINGS OF UNSTEADINESS: 0
RESPIRATORY NEGATIVE: 1
CONSTITUTIONAL NEGATIVE: 1
EYES NEGATIVE: 1
BACK PAIN: 0
DEPRESSION: 0
LOSS OF SENSATION IN FEET: 0
ARTHRALGIAS: 0

## 2023-12-18 ASSESSMENT — PATIENT HEALTH QUESTIONNAIRE - PHQ9
2. FEELING DOWN, DEPRESSED OR HOPELESS: NOT AT ALL
10. IF YOU CHECKED OFF ANY PROBLEMS, HOW DIFFICULT HAVE THESE PROBLEMS MADE IT FOR YOU TO DO YOUR WORK, TAKE CARE OF THINGS AT HOME, OR GET ALONG WITH OTHER PEOPLE: NOT DIFFICULT AT ALL
1. LITTLE INTEREST OR PLEASURE IN DOING THINGS: NOT AT ALL
SUM OF ALL RESPONSES TO PHQ9 QUESTIONS 1 AND 2: 0

## 2023-12-18 NOTE — ASSESSMENT & PLAN NOTE
Evaluating for hypertension.    Blood pressure well-controlled.    Reviewed BMP electrolytes and kidney function test are normal.

## 2023-12-18 NOTE — PROGRESS NOTES
"Subjective   Patient ID: Cait Wallace is a 53 y.o. female who presents for Follow-up (BP and labs).    Diastolic at 85.  Patient presents for follow-up blood pressures have been improved.    Patient is having no troubles with headache or double vision or blurry vision no troubles with sore throat or difficulty swallowing no troubles with abdominal pain or discomfort.    Patient seen cardiology is going to have transesophageal echo performed.  She also has a 30-day monitor performed.    Walking 1 mile. Once a day.         patient presents for follow-up on blood pressure and labs    Review of Systems   Constitutional: Negative.    HENT: Negative.     Eyes: Negative.    Respiratory: Negative.     Cardiovascular: Negative.    Musculoskeletal:  Negative for arthralgias and back pain.       Objective   /82   Pulse 79   Temp 36.3 °C (97.4 °F)   Ht 1.638 m (5' 4.5\")   Wt 88.9 kg (196 lb)   SpO2 97%   BMI 33.12 kg/m²   BSA Body surface area is 2.01 meters squared.      Physical Exam  Constitutional:       Appearance: She is normal weight.   Cardiovascular:      Rate and Rhythm: Normal rate and regular rhythm.   Pulmonary:      Effort: Pulmonary effort is normal.   Abdominal:      General: Abdomen is flat.   Musculoskeletal:      Cervical back: No rigidity.   Lymphadenopathy:      Cervical: No cervical adenopathy.   Neurological:      Mental Status: She is alert.       Lab on 12/15/2023   Component Date Value Ref Range Status    Glucose 12/15/2023 86  74 - 99 mg/dL Final    Sodium 12/15/2023 141  136 - 145 mmol/L Final    Potassium 12/15/2023 4.3  3.5 - 5.3 mmol/L Final    Chloride 12/15/2023 104  98 - 107 mmol/L Final    Bicarbonate 12/15/2023 30  21 - 32 mmol/L Final    Anion Gap 12/15/2023 11  10 - 20 mmol/L Final    Urea Nitrogen 12/15/2023 14  6 - 23 mg/dL Final    Creatinine 12/15/2023 0.80  0.50 - 1.05 mg/dL Final    eGFR 12/15/2023 88  >60 mL/min/1.73m*2 Final    Calculations of estimated GFR are " performed using the 2021 CKD-EPI Study Refit equation without the race variable for the IDMS-Traceable creatinine methods.  https://jasn.asnjournals.org/content/early/2021/09/22/ASN.5180401585    Calcium 12/15/2023 9.7  8.6 - 10.6 mg/dL Final   Hospital Outpatient Visit on 11/07/2023   Component Date Value Ref Range Status    LVOT diam 11/07/2023 2.00   Final    LV biplane EF 11/07/2023 55   Final    MV avg E/e' ratio 11/07/2023 6.24   Final    MV E/A ratio 11/07/2023 0.84   Final    LA vol index A/L 11/07/2023 28.0   Final    Tricuspid annular plane systolic e* 11/07/2023 2.4   Final    RV free wall pk S' 11/07/2023 9.95   Final    LV A4C EF 11/07/2023 64.8   Final   Lab on 10/13/2023   Component Date Value Ref Range Status    WBC 10/13/2023 7.3  4.4 - 11.3 x10*3/uL Final    nRBC 10/13/2023 0.0  0.0 - 0.0 /100 WBCs Final    RBC 10/13/2023 4.89  4.00 - 5.20 x10*6/uL Final    Hemoglobin 10/13/2023 14.6  12.0 - 16.0 g/dL Final    Hematocrit 10/13/2023 46.1 (H)  36.0 - 46.0 % Final    MCV 10/13/2023 94  80 - 100 fL Final    MCH 10/13/2023 29.9  26.0 - 34.0 pg Final    MCHC 10/13/2023 31.7 (L)  32.0 - 36.0 g/dL Final    RDW 10/13/2023 13.0  11.5 - 14.5 % Final    Platelets 10/13/2023 395  150 - 450 x10*3/uL Final    MPV 10/13/2023 10.2  7.5 - 11.5 fL Final    Neutrophils % 10/13/2023 69.0  40.0 - 80.0 % Final    Immature Granulocytes %, Automated 10/13/2023 0.3  0.0 - 0.9 % Final    Immature Granulocyte Count (IG) includes promyelocytes, myelocytes and metamyelocytes but does not include bands. Percent differential counts (%) should be interpreted in the context of the absolute cell counts (cells/UL).    Lymphocytes % 10/13/2023 21.1  13.0 - 44.0 % Final    Monocytes % 10/13/2023 5.5  2.0 - 10.0 % Final    Eosinophils % 10/13/2023 2.9  0.0 - 6.0 % Final    Basophils % 10/13/2023 1.2  0.0 - 2.0 % Final    Neutrophils Absolute 10/13/2023 5.05  1.20 - 7.70 x10*3/uL Final    Percent differential counts (%) should be  interpreted in the context of the absolute cell counts (cells/uL).    Immature Granulocytes Absolute, Au* 10/13/2023 0.02  0.00 - 0.70 x10*3/uL Final    Lymphocytes Absolute 10/13/2023 1.54  1.20 - 4.80 x10*3/uL Final    Monocytes Absolute 10/13/2023 0.40  0.10 - 1.00 x10*3/uL Final    Eosinophils Absolute 10/13/2023 0.21  0.00 - 0.70 x10*3/uL Final    Basophils Absolute 10/13/2023 0.09  0.00 - 0.10 x10*3/uL Final    Glucose 10/13/2023 88  74 - 99 mg/dL Final    Sodium 10/13/2023 141  136 - 145 mmol/L Final    Potassium 10/13/2023 4.4  3.5 - 5.3 mmol/L Final    Chloride 10/13/2023 106  98 - 107 mmol/L Final    Bicarbonate 10/13/2023 25  21 - 32 mmol/L Final    Anion Gap 10/13/2023 14  10 - 20 mmol/L Final    Urea Nitrogen 10/13/2023 11  6 - 23 mg/dL Final    Creatinine 10/13/2023 0.71  0.50 - 1.05 mg/dL Final    eGFR 10/13/2023 >90  >60 mL/min/1.73m*2 Final    Calculations of estimated GFR are performed using the 2021 CKD-EPI Study Refit equation without the race variable for the IDMS-Traceable creatinine methods.  https://jasn.asnjournals.org/content/early/2021/09/22/ASN.1313025285    Calcium 10/13/2023 9.4  8.6 - 10.6 mg/dL Final    Albumin 10/13/2023 4.3  3.4 - 5.0 g/dL Final    Alkaline Phosphatase 10/13/2023 70  33 - 110 U/L Final    Total Protein 10/13/2023 7.1  6.4 - 8.2 g/dL Final    AST 10/13/2023 15  9 - 39 U/L Final    Bilirubin, Total 10/13/2023 0.4  0.0 - 1.2 mg/dL Final    ALT 10/13/2023 16  7 - 45 U/L Final    Patients treated with Sulfasalazine may generate falsely decreased results for ALT.    Cholesterol 10/13/2023 216 (H)  0 - 199 mg/dL Final          Age      Desirable   Borderline High   High     0-19 Y     0 - 169       170 - 199     >/= 200    20-24 Y     0 - 189       190 - 224     >/= 225         >24 Y     0 - 199       200 - 239     >/= 240   **All ranges are based on fasting samples. Specific   therapeutic targets will vary based on patient-specific   cardiac risk.    Pediatric guidelines  reference:Pediatrics 2011, 128(S5).Adult guidelines reference: NCEP ATPIII Guidelines,OPAL 2001, 258:2486-97    Venipuncture immediately after or during the administration of Metamizole may lead to falsely low results. Testing should be performed immediately prior to Metamizole dosing.    HDL-Cholesterol 10/13/2023 34.3  mg/dL Final      Age       Very Low   Low     Normal    High    0-19 Y    < 35      < 40     40-45     ----  20-24 Y    ----     < 40      >45      ----        >24 Y      ----     < 40     40-60      >60      Cholesterol/HDL Ratio 10/13/2023 6.3   Final      Ref Values  Desirable  < 3.4  High Risk  > 5.0    LDL Calculated 10/13/2023 135 (H)  <=99 mg/dL Final                                Near   Borderline      AGE      Desirable  Optimal    High     High     Very High     0-19 Y     0 - 109     ---    110-129   >/= 130     ----    20-24 Y     0 - 119     ---    120-159   >/= 160     ----      >24 Y     0 -  99   100-129  130-159   160-189     >/=190      VLDL 10/13/2023 47 (H)  0 - 40 mg/dL Final    Triglycerides 10/13/2023 236 (H)  0 - 149 mg/dL Final       Age         Desirable   Borderline High   High     Very High   0 D-90 D    19 - 174         ----         ----        ----  91 D- 9 Y     0 -  74        75 -  99     >/= 100      ----    10-19 Y     0 -  89        90 - 129     >/= 130      ----    20-24 Y     0 - 114       115 - 149     >/= 150      ----         >24 Y     0 - 149       150 - 199    200- 499    >/= 500    Venipuncture immediately after or during the administration of Metamizole may lead to falsely low results. Testing should be performed immediately prior to Metamizole dosing.    Non HDL Cholesterol 10/13/2023 182 (H)  0 - 149 mg/dL Final          Age       Desirable   Borderline High   High     Very High     0-19 Y     0 - 119       120 - 144     >/= 145    >/= 160    20-24 Y     0 - 149       150 - 189     >/= 190      ----         >24 Y    30 mg/dL above LDL Cholesterol goal       Thyroid Stimulating Hormone 10/13/2023 0.77  0.44 - 3.98 mIU/L Final     Current Outpatient Medications on File Prior to Visit   Medication Sig Dispense Refill    aspirin 81 mg EC tablet Take 1 tablet (81 mg) by mouth once daily. 90 tablet 3    cholecalciferol (Vitamin D-3) 25 MCG (1000 UT) tablet Take 1 tablet (25 mcg) by mouth once daily.      escitalopram (Lexapro) 10 mg tablet Take 1 tablet (10 mg) by mouth once daily. Patient needs follow-up appointment to be seen 90 tablet 1    losartan (Cozaar) 50 mg tablet Take 1 tablet (50 mg) by mouth once daily. 30 tablet 0    cyclobenzaprine (Flexeril) 5 mg tablet Take by mouth.       No current facility-administered medications on file prior to visit.     No images are attached to the encounter.            Assessment/Plan   Problem List Items Addressed This Visit             ICD-10-CM    Primary hypertension I10     Evaluating for hypertension.    Blood pressure well-controlled.    Reviewed BMP electrolytes and kidney function test are normal.

## 2023-12-18 NOTE — PATIENT INSTRUCTIONS
Blood pressure well-controlled.  Labs have been reviewed.      Not approving losartan for 90 days.    Would like to follow-up in 90 days to recheck blood pressure    Reviewed blood pressure diary and most numbers are at goal   [General Appearance - Alert] : alert [General Appearance - In No Acute Distress] : in no acute distress [General Appearance - Well Nourished] : well nourished [General Appearance - Well Developed] : well developed [General Appearance - Well-Appearing] : healthy appearing [Sclera] : the sclera and conjunctiva were normal [PERRL With Normal Accommodation] : pupils were equal in size, round, and reactive to light [Extraocular Movements] : extraocular movements were intact [Outer Ear] : the ears and nose were normal in appearance [Hearing Threshold Finger Rub Not Isabela] : hearing was normal [Examination Of The Oral Cavity] : the lips and gums were normal [Neck Appearance] : the appearance of the neck was normal [Heart Rate And Rhythm] : heart rate was normal and rhythm regular [Bowel Sounds] : normal bowel sounds [Abdomen Soft] : soft [Abdomen Tenderness] : non-tender [Abdomen Mass (___ Cm)] : no abdominal mass palpated [Abnormal Walk] : normal gait [Nail Clubbing] : no clubbing  or cyanosis of the fingernails [Musculoskeletal - Swelling] : no joint swelling seen [Skin Color & Pigmentation] : normal skin color and pigmentation [Skin Turgor] : normal skin turgor [] : no rash [Motor Exam] : the motor exam was normal [No Focal Deficits] : no focal deficits [Oriented To Time, Place, And Person] : oriented to person, place, and time [Impaired Insight] : insight and judgment were intact [Affect] : the affect was normal

## 2023-12-19 LAB
PROT C ACT/NOR PPP CHRO: 115 % ACTIVITY (ref 70–150)
PROT S ACT/NOR PPP: 122 % ACTIVITY (ref 64–150)

## 2024-01-25 ENCOUNTER — HOSPITAL ENCOUNTER (OUTPATIENT)
Dept: RADIOLOGY | Facility: CLINIC | Age: 54
Discharge: HOME | End: 2024-01-25
Payer: COMMERCIAL

## 2024-01-25 ENCOUNTER — LAB (OUTPATIENT)
Dept: LAB | Facility: LAB | Age: 54
End: 2024-01-25
Payer: COMMERCIAL

## 2024-01-25 DIAGNOSIS — E78.2 MIXED HYPERLIPIDEMIA: ICD-10-CM

## 2024-01-25 PROCEDURE — 83718 ASSAY OF LIPOPROTEIN: CPT | Performed by: FAMILY MEDICINE

## 2024-01-25 PROCEDURE — 75571 CT HRT W/O DYE W/CA TEST: CPT

## 2024-01-26 LAB
CHOLEST SERPL-MCNC: 217 MG/DL (ref 0–199)
CHOLESTEROL/HDL RATIO: 4.7
HDLC SERPL-MCNC: 46.5 MG/DL
LDLC SERPL CALC-MCNC: 142 MG/DL
NON HDL CHOLESTEROL: 171 MG/DL (ref 0–149)
TRIGL SERPL-MCNC: 142 MG/DL (ref 0–149)
VLDL: 28 MG/DL (ref 0–40)

## 2024-03-18 ENCOUNTER — APPOINTMENT (OUTPATIENT)
Dept: PRIMARY CARE | Facility: CLINIC | Age: 54
End: 2024-03-18
Payer: COMMERCIAL

## 2024-03-19 ENCOUNTER — OFFICE VISIT (OUTPATIENT)
Dept: PRIMARY CARE | Facility: CLINIC | Age: 54
End: 2024-03-19
Payer: COMMERCIAL

## 2024-03-19 VITALS
HEART RATE: 68 BPM | BODY MASS INDEX: 32.49 KG/M2 | DIASTOLIC BLOOD PRESSURE: 74 MMHG | SYSTOLIC BLOOD PRESSURE: 114 MMHG | OXYGEN SATURATION: 96 % | TEMPERATURE: 97.8 F | HEIGHT: 65 IN | WEIGHT: 195 LBS

## 2024-03-19 DIAGNOSIS — Q24.8 INTERATRIAL CARDIAC SHUNT (HHS-HCC): ICD-10-CM

## 2024-03-19 DIAGNOSIS — E78.2 MIXED HYPERLIPIDEMIA: ICD-10-CM

## 2024-03-19 DIAGNOSIS — I10 PRIMARY HYPERTENSION: ICD-10-CM

## 2024-03-19 DIAGNOSIS — G45.3 AMAUROSIS FUGAX OF LEFT EYE: Primary | ICD-10-CM

## 2024-03-19 DIAGNOSIS — F41.9 ANXIETY: ICD-10-CM

## 2024-03-19 PROCEDURE — 3074F SYST BP LT 130 MM HG: CPT | Performed by: FAMILY MEDICINE

## 2024-03-19 PROCEDURE — 3078F DIAST BP <80 MM HG: CPT | Performed by: FAMILY MEDICINE

## 2024-03-19 PROCEDURE — 1036F TOBACCO NON-USER: CPT | Performed by: FAMILY MEDICINE

## 2024-03-19 PROCEDURE — 99213 OFFICE O/P EST LOW 20 MIN: CPT | Performed by: FAMILY MEDICINE

## 2024-03-19 RX ORDER — ESCITALOPRAM OXALATE 10 MG/1
10 TABLET ORAL DAILY
Qty: 90 TABLET | Refills: 1 | Status: SHIPPED | OUTPATIENT
Start: 2024-03-19 | End: 2025-03-19

## 2024-03-19 ASSESSMENT — ENCOUNTER SYMPTOMS
PALPITATIONS: 0
FEVER: 0
DIAPHORESIS: 0
LOSS OF SENSATION IN FEET: 0
DEPRESSION: 0
GASTROINTESTINAL NEGATIVE: 1
OCCASIONAL FEELINGS OF UNSTEADINESS: 0
APPETITE CHANGE: 0
RESPIRATORY NEGATIVE: 1

## 2024-03-19 ASSESSMENT — PATIENT HEALTH QUESTIONNAIRE - PHQ9
10. IF YOU CHECKED OFF ANY PROBLEMS, HOW DIFFICULT HAVE THESE PROBLEMS MADE IT FOR YOU TO DO YOUR WORK, TAKE CARE OF THINGS AT HOME, OR GET ALONG WITH OTHER PEOPLE: NOT DIFFICULT AT ALL
1. LITTLE INTEREST OR PLEASURE IN DOING THINGS: NOT AT ALL
2. FEELING DOWN, DEPRESSED OR HOPELESS: NOT AT ALL
SUM OF ALL RESPONSES TO PHQ9 QUESTIONS 1 AND 2: 0

## 2024-03-19 NOTE — PATIENT INSTRUCTIONS
Please have lipids performed at your convenience.    Please follow-up with cardiovascular surgeon regarding patent foramen ovale.    Because of the history of TIA would strongly consider having the patch performed.

## 2024-03-19 NOTE — PROGRESS NOTES
"Subjective   Patient ID: Cait Wallace is a 54 y.o. female who presents for Follow-up (labs).    Patient with history of PFO.    Patient is going to be meeting with cardiac surgeon for possible patch.  No further episodes of TIA.  She did have a transesophageal echo.  Patient's had no troubles with chest pain or shortness of breath no dizziness no lightheadedness.  Does have history of hyperlipidemia.         Review of Systems   Constitutional:  Negative for appetite change, diaphoresis and fever.   HENT:  Negative for congestion, drooling, hearing loss and nosebleeds.    Respiratory: Negative.     Cardiovascular:  Negative for chest pain, palpitations and leg swelling.   Gastrointestinal: Negative.        Objective   /74   Pulse 68   Temp 36.6 °C (97.8 °F)   Ht 1.638 m (5' 4.5\")   Wt 88.5 kg (195 lb)   LMP 11/14/2023 (Approximate)   SpO2 96%   BMI 32.95 kg/m²   BSA Body surface area is 2.01 meters squared.      Physical Exam  Constitutional:       Appearance: Normal appearance.   HENT:      Head: Normocephalic.   Cardiovascular:      Rate and Rhythm: Normal rate and regular rhythm.      Pulses: Normal pulses.      Heart sounds: Normal heart sounds.   Neurological:      Mental Status: She is alert.       Hospital Outpatient Visit on 01/25/2024   Component Date Value Ref Range Status    Cholesterol 01/25/2024 217 (H)  0 - 199 mg/dL Final          Age      Desirable   Borderline High   High     0-19 Y     0 - 169       170 - 199     >/= 200    20-24 Y     0 - 189       190 - 224     >/= 225         >24 Y     0 - 199       200 - 239     >/= 240   **All ranges are based on fasting samples. Specific   therapeutic targets will vary based on patient-specific   cardiac risk.    Pediatric guidelines reference:Pediatrics 2011, 128(S5).Adult guidelines reference: NCEP ATPIII Guidelines,OPAL 2001, 258:2486-97    Venipuncture immediately after or during the administration of Metamizole may lead to falsely low " results. Testing should be performed immediately prior to Metamizole dosing.    HDL-Cholesterol 01/25/2024 46.5  mg/dL Final      Age       Very Low   Low     Normal    High    0-19 Y    < 35      < 40     40-45     ----  20-24 Y    ----     < 40      >45      ----        >24 Y      ----     < 40     40-60      >60      Cholesterol/HDL Ratio 01/25/2024 4.7   Final      Ref Values  Desirable  < 3.4  High Risk  > 5.0    LDL Calculated 01/25/2024 142 (H)  <=99 mg/dL Final                                Near   Borderline      AGE      Desirable  Optimal    High     High     Very High     0-19 Y     0 - 109     ---    110-129   >/= 130     ----    20-24 Y     0 - 119     ---    120-159   >/= 160     ----      >24 Y     0 -  99   100-129  130-159   160-189     >/=190      VLDL 01/25/2024 28  0 - 40 mg/dL Final    Triglycerides 01/25/2024 142  0 - 149 mg/dL Final       Age         Desirable   Borderline High   High     Very High   0 D-90 D    19 - 174         ----         ----        ----  91 D- 9 Y     0 -  74        75 -  99     >/= 100      ----    10-19 Y     0 -  89        90 - 129     >/= 130      ----    20-24 Y     0 - 114       115 - 149     >/= 150      ----         >24 Y     0 - 149       150 - 199    200- 499    >/= 500    Venipuncture immediately after or during the administration of Metamizole may lead to falsely low results. Testing should be performed immediately prior to Metamizole dosing.    Non HDL Cholesterol 01/25/2024 171 (H)  0 - 149 mg/dL Final          Age       Desirable   Borderline High   High     Very High     0-19 Y     0 - 119       120 - 144     >/= 145    >/= 160    20-24 Y     0 - 149       150 - 189     >/= 190      ----         >24 Y    30 mg/dL above LDL Cholesterol goal     Lab on 12/18/2023   Component Date Value Ref Range Status    WBC 12/18/2023 5.8  4.4 - 11.3 x10*3/uL Final    nRBC 12/18/2023 0.0  0.0 - 0.0 /100 WBCs Final    RBC 12/18/2023 4.79  4.00 - 5.20 x10*6/uL Final     Hemoglobin 12/18/2023 14.1  12.0 - 16.0 g/dL Final    Hematocrit 12/18/2023 44.1  36.0 - 46.0 % Final    MCV 12/18/2023 92  80 - 100 fL Final    MCH 12/18/2023 29.4  26.0 - 34.0 pg Final    MCHC 12/18/2023 32.0  32.0 - 36.0 g/dL Final    RDW 12/18/2023 12.8  11.5 - 14.5 % Final    Platelets 12/18/2023 355  150 - 450 x10*3/uL Final    Glucose 12/18/2023 90  74 - 99 mg/dL Final    Sodium 12/18/2023 141  136 - 145 mmol/L Final    Potassium 12/18/2023 4.2  3.5 - 5.3 mmol/L Final    Chloride 12/18/2023 104  98 - 107 mmol/L Final    Bicarbonate 12/18/2023 28  21 - 32 mmol/L Final    Anion Gap 12/18/2023 13  10 - 20 mmol/L Final    Urea Nitrogen 12/18/2023 17  6 - 23 mg/dL Final    Creatinine 12/18/2023 0.71  0.50 - 1.05 mg/dL Final    eGFR 12/18/2023 >90  >60 mL/min/1.73m*2 Final    Calculations of estimated GFR are performed using the 2021 CKD-EPI Study Refit equation without the race variable for the IDMS-Traceable creatinine methods.  https://jasn.asnjournals.org/content/early/2021/09/22/ASN.1415294146    Calcium 12/18/2023 9.6  8.6 - 10.6 mg/dL Final   Lab on 12/15/2023   Component Date Value Ref Range Status    Glucose 12/15/2023 86  74 - 99 mg/dL Final    Sodium 12/15/2023 141  136 - 145 mmol/L Final    Potassium 12/15/2023 4.3  3.5 - 5.3 mmol/L Final    Chloride 12/15/2023 104  98 - 107 mmol/L Final    Bicarbonate 12/15/2023 30  21 - 32 mmol/L Final    Anion Gap 12/15/2023 11  10 - 20 mmol/L Final    Urea Nitrogen 12/15/2023 14  6 - 23 mg/dL Final    Creatinine 12/15/2023 0.80  0.50 - 1.05 mg/dL Final    eGFR 12/15/2023 88  >60 mL/min/1.73m*2 Final    Calculations of estimated GFR are performed using the 2021 CKD-EPI Study Refit equation without the race variable for the IDMS-Traceable creatinine methods.  https://jasn.asnjournals.org/content/early/2021/09/22/ASN.5618252848    Calcium 12/15/2023 9.7  8.6 - 10.6 mg/dL Final    Protein C Activity 12/15/2023 115  70 - 150 % activity Final    DRVVT Screen 12/15/2023  0.94  RATIO Final    DRVVT Confirmation 12/15/2023 0.95  RATIO Final    DRVVT Test Ratio 12/15/2023 0.99  <=1.20 RATIO Final    SCT Screen 12/15/2023 0.93  RATIO Final    SCT Confirmation 12/15/2023 0.88  RATIO Final    SCT Test Ratio 12/15/2023 1.05  <=1.16 RATIO Final    Lupus Anticoagulant Interpretation 12/15/2023    Final                    Value:No evidence of lupus anticoagulant in these assays (DRVVT and Silica Clotting Time (SCT)). Assay interferences may occur in the presence of factor deficiency/inhibitor and/or anticoagulants. For patients on anti-Vitamin K therapy, repeating DRVVT testing might be indicated when the patient is off anti-vitamin K therapy. The DRVVT assay contains a heparin neutralizer up to 1.0 U/mL. Higher concentrations of heparin may cause interferences. SCT results are not affected by UF heparin up to 0.5 U/mL and LMW Heparin up to 1.0 U/mL. Higher concentrations of heparin may cause interferences. Correlation with clinical findings and clinical history is necessary to assess significance of results in an individual patient.    Antithrombin Antigen 12/15/2023 102  82 - 136 % Final    REFERENCE INTERVAL: Antithrombin Antigen    Access complete set of age- and/or gender-specific reference   intervals for this test in the [facility] Test Directory   ([email or web address]).  Performed By: [facility]  [address] [place] [id number]  : Balwinder Mario MD, PhD  CLIA Number: [id number]    Protein S Activity 12/15/2023 122  64 - 150 % activity Final   Hospital Outpatient Visit on 11/07/2023   Component Date Value Ref Range Status    LVOT diam 11/07/2023 2.00   Final    LV biplane EF 11/07/2023 55   Final    MV avg E/e' ratio 11/07/2023 6.24   Final    MV E/A ratio 11/07/2023 0.84   Final    LA vol index A/L 11/07/2023 28.0   Final    Tricuspid annular plane systolic e* 11/07/2023 2.4   Final    RV free wall pk S' 11/07/2023 9.95   Final    LV A4C EF  11/07/2023 64.8   Final   Lab on 10/13/2023   Component Date Value Ref Range Status    WBC 10/13/2023 7.3  4.4 - 11.3 x10*3/uL Final    nRBC 10/13/2023 0.0  0.0 - 0.0 /100 WBCs Final    RBC 10/13/2023 4.89  4.00 - 5.20 x10*6/uL Final    Hemoglobin 10/13/2023 14.6  12.0 - 16.0 g/dL Final    Hematocrit 10/13/2023 46.1 (H)  36.0 - 46.0 % Final    MCV 10/13/2023 94  80 - 100 fL Final    MCH 10/13/2023 29.9  26.0 - 34.0 pg Final    MCHC 10/13/2023 31.7 (L)  32.0 - 36.0 g/dL Final    RDW 10/13/2023 13.0  11.5 - 14.5 % Final    Platelets 10/13/2023 395  150 - 450 x10*3/uL Final    MPV 10/13/2023 10.2  7.5 - 11.5 fL Final    Neutrophils % 10/13/2023 69.0  40.0 - 80.0 % Final    Immature Granulocytes %, Automated 10/13/2023 0.3  0.0 - 0.9 % Final    Immature Granulocyte Count (IG) includes promyelocytes, myelocytes and metamyelocytes but does not include bands. Percent differential counts (%) should be interpreted in the context of the absolute cell counts (cells/UL).    Lymphocytes % 10/13/2023 21.1  13.0 - 44.0 % Final    Monocytes % 10/13/2023 5.5  2.0 - 10.0 % Final    Eosinophils % 10/13/2023 2.9  0.0 - 6.0 % Final    Basophils % 10/13/2023 1.2  0.0 - 2.0 % Final    Neutrophils Absolute 10/13/2023 5.05  1.20 - 7.70 x10*3/uL Final    Percent differential counts (%) should be interpreted in the context of the absolute cell counts (cells/uL).    Immature Granulocytes Absolute, Au* 10/13/2023 0.02  0.00 - 0.70 x10*3/uL Final    Lymphocytes Absolute 10/13/2023 1.54  1.20 - 4.80 x10*3/uL Final    Monocytes Absolute 10/13/2023 0.40  0.10 - 1.00 x10*3/uL Final    Eosinophils Absolute 10/13/2023 0.21  0.00 - 0.70 x10*3/uL Final    Basophils Absolute 10/13/2023 0.09  0.00 - 0.10 x10*3/uL Final    Glucose 10/13/2023 88  74 - 99 mg/dL Final    Sodium 10/13/2023 141  136 - 145 mmol/L Final    Potassium 10/13/2023 4.4  3.5 - 5.3 mmol/L Final    Chloride 10/13/2023 106  98 - 107 mmol/L Final    Bicarbonate 10/13/2023 25  21 - 32  mmol/L Final    Anion Gap 10/13/2023 14  10 - 20 mmol/L Final    Urea Nitrogen 10/13/2023 11  6 - 23 mg/dL Final    Creatinine 10/13/2023 0.71  0.50 - 1.05 mg/dL Final    eGFR 10/13/2023 >90  >60 mL/min/1.73m*2 Final    Calculations of estimated GFR are performed using the 2021 CKD-EPI Study Refit equation without the race variable for the IDMS-Traceable creatinine methods.  https://jasn.asnjournals.org/content/early/2021/09/22/ASN.8071531493    Calcium 10/13/2023 9.4  8.6 - 10.6 mg/dL Final    Albumin 10/13/2023 4.3  3.4 - 5.0 g/dL Final    Alkaline Phosphatase 10/13/2023 70  33 - 110 U/L Final    Total Protein 10/13/2023 7.1  6.4 - 8.2 g/dL Final    AST 10/13/2023 15  9 - 39 U/L Final    Bilirubin, Total 10/13/2023 0.4  0.0 - 1.2 mg/dL Final    ALT 10/13/2023 16  7 - 45 U/L Final    Patients treated with Sulfasalazine may generate falsely decreased results for ALT.    Cholesterol 10/13/2023 216 (H)  0 - 199 mg/dL Final          Age      Desirable   Borderline High   High     0-19 Y     0 - 169       170 - 199     >/= 200    20-24 Y     0 - 189       190 - 224     >/= 225         >24 Y     0 - 199       200 - 239     >/= 240   **All ranges are based on fasting samples. Specific   therapeutic targets will vary based on patient-specific   cardiac risk.    Pediatric guidelines reference:Pediatrics 2011, 128(S5).Adult guidelines reference: NCEP ATPIII Guidelines,OPAL 2001, 258:2486-97    Venipuncture immediately after or during the administration of Metamizole may lead to falsely low results. Testing should be performed immediately prior to Metamizole dosing.    HDL-Cholesterol 10/13/2023 34.3  mg/dL Final      Age       Very Low   Low     Normal    High    0-19 Y    < 35      < 40     40-45     ----  20-24 Y    ----     < 40      >45      ----        >24 Y      ----     < 40     40-60      >60      Cholesterol/HDL Ratio 10/13/2023 6.3   Final      Ref Values  Desirable  < 3.4  High Risk  > 5.0    LDL Calculated  10/13/2023 135 (H)  <=99 mg/dL Final                                Near   Borderline      AGE      Desirable  Optimal    High     High     Very High     0-19 Y     0 - 109     ---    110-129   >/= 130     ----    20-24 Y     0 - 119     ---    120-159   >/= 160     ----      >24 Y     0 -  99   100-129  130-159   160-189     >/=190      VLDL 10/13/2023 47 (H)  0 - 40 mg/dL Final    Triglycerides 10/13/2023 236 (H)  0 - 149 mg/dL Final       Age         Desirable   Borderline High   High     Very High   0 D-90 D    19 - 174         ----         ----        ----  91 D- 9 Y     0 -  74        75 -  99     >/= 100      ----    10-19 Y     0 -  89        90 - 129     >/= 130      ----    20-24 Y     0 - 114       115 - 149     >/= 150      ----         >24 Y     0 - 149       150 - 199    200- 499    >/= 500    Venipuncture immediately after or during the administration of Metamizole may lead to falsely low results. Testing should be performed immediately prior to Metamizole dosing.    Non HDL Cholesterol 10/13/2023 182 (H)  0 - 149 mg/dL Final          Age       Desirable   Borderline High   High     Very High     0-19 Y     0 - 119       120 - 144     >/= 145    >/= 160    20-24 Y     0 - 149       150 - 189     >/= 190      ----         >24 Y    30 mg/dL above LDL Cholesterol goal      Thyroid Stimulating Hormone 10/13/2023 0.77  0.44 - 3.98 mIU/L Final     Current Outpatient Medications on File Prior to Visit   Medication Sig Dispense Refill    aspirin 81 mg EC tablet Take 1 tablet (81 mg) by mouth once daily. 90 tablet 3    cholecalciferol (Vitamin D-3) 25 MCG (1000 UT) tablet Take 1 tablet (25 mcg) by mouth once daily.      escitalopram (Lexapro) 10 mg tablet Take 1 tablet (10 mg) by mouth once daily. Patient needs follow-up appointment to be seen 90 tablet 1    losartan (Cozaar) 50 mg tablet Take 1 tablet (50 mg) by mouth once daily. 90 tablet 3    [DISCONTINUED] cyclobenzaprine (Flexeril) 5 mg tablet Take by  mouth.       No current facility-administered medications on file prior to visit.     No images are attached to the encounter.            Assessment/Plan   Problem List Items Addressed This Visit             ICD-10-CM    Anxiety F41.9    Primary hypertension I10    Amaurosis fugax of left eye - Primary G45.3    Mixed hyperlipidemia E78.2    Interatrial cardiac shunt Q24.8

## 2024-04-11 ENCOUNTER — TELEPHONE (OUTPATIENT)
Dept: PRIMARY CARE | Facility: CLINIC | Age: 54
End: 2024-04-11
Payer: COMMERCIAL

## 2024-04-11 NOTE — TELEPHONE ENCOUNTER
Pt left message that she saw cardiology interventional for PFO and they recommend doing MRI of Head to see if there's any stroke activity. She is requesting an order.

## 2024-04-12 DIAGNOSIS — G45.3 AMAUROSIS FUGAX OF LEFT EYE: ICD-10-CM

## 2024-05-14 ENCOUNTER — HOSPITAL ENCOUNTER (OUTPATIENT)
Dept: RADIOLOGY | Facility: CLINIC | Age: 54
Discharge: HOME | End: 2024-05-14
Payer: COMMERCIAL

## 2024-05-14 ENCOUNTER — OFFICE VISIT (OUTPATIENT)
Dept: PRIMARY CARE | Facility: CLINIC | Age: 54
End: 2024-05-14
Payer: COMMERCIAL

## 2024-05-14 VITALS
DIASTOLIC BLOOD PRESSURE: 71 MMHG | SYSTOLIC BLOOD PRESSURE: 107 MMHG | HEART RATE: 75 BPM | TEMPERATURE: 98.6 F | OXYGEN SATURATION: 95 % | RESPIRATION RATE: 16 BRPM | BODY MASS INDEX: 31.96 KG/M2 | HEIGHT: 65 IN | WEIGHT: 191.8 LBS

## 2024-05-14 DIAGNOSIS — S82.101A CLOSED FRACTURE OF PROXIMAL END OF RIGHT TIBIA, UNSPECIFIED FRACTURE MORPHOLOGY, INITIAL ENCOUNTER: ICD-10-CM

## 2024-05-14 DIAGNOSIS — S83.411A SPRAIN OF MEDIAL COLLATERAL LIGAMENT OF RIGHT KNEE, INITIAL ENCOUNTER: ICD-10-CM

## 2024-05-14 DIAGNOSIS — R26.89 ANTALGIC GAIT: ICD-10-CM

## 2024-05-14 DIAGNOSIS — M25.561 ACUTE PAIN OF RIGHT KNEE: Primary | ICD-10-CM

## 2024-05-14 DIAGNOSIS — S83.241A TEAR OF MEDIAL MENISCUS OF RIGHT KNEE, CURRENT, UNSPECIFIED TEAR TYPE, INITIAL ENCOUNTER: ICD-10-CM

## 2024-05-14 DIAGNOSIS — M25.561 ACUTE PAIN OF RIGHT KNEE: ICD-10-CM

## 2024-05-14 DIAGNOSIS — M25.361 INSTABILITY OF RIGHT KNEE JOINT: ICD-10-CM

## 2024-05-14 PROCEDURE — 99213 OFFICE O/P EST LOW 20 MIN: CPT | Performed by: STUDENT IN AN ORGANIZED HEALTH CARE EDUCATION/TRAINING PROGRAM

## 2024-05-14 PROCEDURE — 73562 X-RAY EXAM OF KNEE 3: CPT | Mod: RIGHT SIDE | Performed by: RADIOLOGY

## 2024-05-14 PROCEDURE — 3074F SYST BP LT 130 MM HG: CPT | Performed by: STUDENT IN AN ORGANIZED HEALTH CARE EDUCATION/TRAINING PROGRAM

## 2024-05-14 PROCEDURE — 1036F TOBACCO NON-USER: CPT | Performed by: STUDENT IN AN ORGANIZED HEALTH CARE EDUCATION/TRAINING PROGRAM

## 2024-05-14 PROCEDURE — 3078F DIAST BP <80 MM HG: CPT | Performed by: STUDENT IN AN ORGANIZED HEALTH CARE EDUCATION/TRAINING PROGRAM

## 2024-05-14 PROCEDURE — 73562 X-RAY EXAM OF KNEE 3: CPT | Mod: RT

## 2024-05-14 ASSESSMENT — ENCOUNTER SYMPTOMS
WEAKNESS: 0
HEADACHES: 0
FATIGUE: 0
DEPRESSION: 0
SHORTNESS OF BREATH: 0
ARTHRALGIAS: 1
FEVER: 0
LOSS OF SENSATION IN FEET: 0
NUMBNESS: 0
PALPITATIONS: 0
DIZZINESS: 0
MYALGIAS: 0
COUGH: 0
OCCASIONAL FEELINGS OF UNSTEADINESS: 0
CHILLS: 0
ABDOMINAL PAIN: 0

## 2024-05-14 ASSESSMENT — PATIENT HEALTH QUESTIONNAIRE - PHQ9
2. FEELING DOWN, DEPRESSED OR HOPELESS: NOT AT ALL
SUM OF ALL RESPONSES TO PHQ9 QUESTIONS 1 AND 2: 0
10. IF YOU CHECKED OFF ANY PROBLEMS, HOW DIFFICULT HAVE THESE PROBLEMS MADE IT FOR YOU TO DO YOUR WORK, TAKE CARE OF THINGS AT HOME, OR GET ALONG WITH OTHER PEOPLE: NOT DIFFICULT AT ALL
1. LITTLE INTEREST OR PLEASURE IN DOING THINGS: NOT AT ALL

## 2024-05-14 NOTE — PROGRESS NOTES
"Subjective   Patient ID: Cait Wallace is a 54 y.o. female who presents for Knee Pain (Right;  states she heard a \"pop\" as she stepped up on steps x 2 weeks ago).    Knee Pain   Pertinent negatives include no numbness.        She was stepping up onto a step about 2 weeks ago. The right knee popped and she had pain immediately.  She does have plantar fascitis in that foot and thinks she might have been slightly mispositioned.  She has not had any imaging as of this time.  She states that the pain happens significantly at the time it popped.  She has been having pain in the inside aspect of the right knee since that time.  Usually this worsens over the course of the day.  She is also been having some knee instability as well which has been affecting her walking.    Review of Systems   Constitutional:  Negative for chills, fatigue and fever.   HENT:  Negative for congestion and postnasal drip.    Respiratory:  Negative for cough and shortness of breath.    Cardiovascular:  Negative for chest pain and palpitations.   Gastrointestinal:  Negative for abdominal pain.   Musculoskeletal:  Positive for arthralgias (right knee medial). Negative for myalgias.   Neurological:  Negative for dizziness, weakness, numbness and headaches.       Objective   /71 (BP Location: Left arm, Patient Position: Sitting, BP Cuff Size: Adult)   Pulse 75   Temp 37 °C (98.6 °F)   Resp 16   Ht 1.638 m (5' 4.5\")   Wt 87 kg (191 lb 12.8 oz)   SpO2 95%   BMI 32.41 kg/m²     Physical Exam  Vitals and nursing note reviewed.   Constitutional:       General: She is not in acute distress.     Appearance: Normal appearance. She is obese. She is not ill-appearing or toxic-appearing.   HENT:      Head: Normocephalic and atraumatic.   Musculoskeletal:         General: Tenderness (Medial right knee tenderness to palpation) present. No swelling, deformity or signs of injury. Normal range of motion.      Comments: Pain with flexion of the leg with " right knee held in extension.  Valgus testing positive on the right side.  Varus testing negative on the right.  Erlin's testing positive on the right side.  Lachman's testing negative.   Neurological:      Mental Status: She is alert.         Assessment/Plan   Problem List Items Addressed This Visit    None  Visit Diagnoses         Codes    Acute pain of right knee    -  Primary M25.561    Relevant Orders    XR knee right 3 views    Instability of right knee joint     M25.361    Antalgic gait     R26.89          History and physical examination as above.  Patient presented with acute pain of the right knee primarily on the medial aspect.  She has been having instability as well on the right side in addition to pain.  Testing as above.  Suspect a meniscal injury.  We will start with an x-ray.  If x-ray is negative, likely will need an MRI for further rule out.  Pain is currently affecting her walking.  Minor instability noted on examination.  Recommended over-the-counter medication for pain control as well as possibly a knee sleeve for added support if needed.  She will call with any other questions or concerns.

## 2024-05-16 NOTE — RESULT ENCOUNTER NOTE
Pt is having MRI of brain 5/17/24 and would like to do the knee at the same time.  The PA is still pending, unable to have this done at the same time

## 2024-05-17 ENCOUNTER — HOSPITAL ENCOUNTER (OUTPATIENT)
Dept: RADIOLOGY | Facility: CLINIC | Age: 54
Discharge: HOME | End: 2024-05-17
Payer: COMMERCIAL

## 2024-05-17 DIAGNOSIS — G45.3 AMAUROSIS FUGAX OF LEFT EYE: ICD-10-CM

## 2024-05-17 PROCEDURE — 70551 MRI BRAIN STEM W/O DYE: CPT | Performed by: RADIOLOGY

## 2024-05-17 PROCEDURE — 70551 MRI BRAIN STEM W/O DYE: CPT

## 2024-06-05 ENCOUNTER — HOSPITAL ENCOUNTER (OUTPATIENT)
Dept: RADIOLOGY | Facility: CLINIC | Age: 54
Discharge: HOME | End: 2024-06-05
Payer: COMMERCIAL

## 2024-06-05 DIAGNOSIS — R26.89 ANTALGIC GAIT: ICD-10-CM

## 2024-06-05 DIAGNOSIS — M25.361 INSTABILITY OF RIGHT KNEE JOINT: ICD-10-CM

## 2024-06-05 DIAGNOSIS — M25.561 ACUTE PAIN OF RIGHT KNEE: ICD-10-CM

## 2024-06-05 PROCEDURE — 73721 MRI JNT OF LWR EXTRE W/O DYE: CPT | Mod: RIGHT SIDE | Performed by: STUDENT IN AN ORGANIZED HEALTH CARE EDUCATION/TRAINING PROGRAM

## 2024-06-05 PROCEDURE — 73721 MRI JNT OF LWR EXTRE W/O DYE: CPT | Mod: RT

## 2024-11-18 ENCOUNTER — OFFICE VISIT (OUTPATIENT)
Dept: PRIMARY CARE | Facility: CLINIC | Age: 54
End: 2024-11-18
Payer: COMMERCIAL

## 2024-11-18 VITALS
SYSTOLIC BLOOD PRESSURE: 133 MMHG | TEMPERATURE: 97.5 F | HEART RATE: 76 BPM | BODY MASS INDEX: 32.27 KG/M2 | DIASTOLIC BLOOD PRESSURE: 85 MMHG | WEIGHT: 188 LBS | OXYGEN SATURATION: 97 %

## 2024-11-18 DIAGNOSIS — M54.50 ACUTE LEFT-SIDED LOW BACK PAIN WITHOUT SCIATICA: Primary | ICD-10-CM

## 2024-11-18 DIAGNOSIS — F41.9 ANXIETY: ICD-10-CM

## 2024-11-18 PROCEDURE — 99214 OFFICE O/P EST MOD 30 MIN: CPT | Performed by: NURSE PRACTITIONER

## 2024-11-18 PROCEDURE — 3075F SYST BP GE 130 - 139MM HG: CPT | Performed by: NURSE PRACTITIONER

## 2024-11-18 PROCEDURE — 3079F DIAST BP 80-89 MM HG: CPT | Performed by: NURSE PRACTITIONER

## 2024-11-18 PROCEDURE — 1036F TOBACCO NON-USER: CPT | Performed by: NURSE PRACTITIONER

## 2024-11-18 RX ORDER — ASPIRIN 81 MG/1
81 TABLET ORAL DAILY
COMMUNITY

## 2024-11-18 RX ORDER — CYCLOBENZAPRINE HCL 5 MG
5 TABLET ORAL 3 TIMES DAILY PRN
Qty: 20 TABLET | Refills: 0 | Status: SHIPPED | OUTPATIENT
Start: 2024-11-18

## 2024-11-18 RX ORDER — ESCITALOPRAM OXALATE 10 MG/1
10 TABLET ORAL DAILY
Qty: 90 TABLET | Refills: 1 | Status: SHIPPED | OUTPATIENT
Start: 2024-11-18 | End: 2025-05-17

## 2024-11-18 ASSESSMENT — COLUMBIA-SUICIDE SEVERITY RATING SCALE - C-SSRS
1. IN THE PAST MONTH, HAVE YOU WISHED YOU WERE DEAD OR WISHED YOU COULD GO TO SLEEP AND NOT WAKE UP?: NO
6. HAVE YOU EVER DONE ANYTHING, STARTED TO DO ANYTHING, OR PREPARED TO DO ANYTHING TO END YOUR LIFE?: NO
2. HAVE YOU ACTUALLY HAD ANY THOUGHTS OF KILLING YOURSELF?: NO

## 2024-11-18 ASSESSMENT — ENCOUNTER SYMPTOMS
OCCASIONAL FEELINGS OF UNSTEADINESS: 0
LOSS OF SENSATION IN FEET: 0
DEPRESSION: 0

## 2024-11-18 NOTE — PROGRESS NOTES
Subjective   Patient ID: Cait Wallace is a 54 y.o. female who presents for Pain (Left hip pain).    HPI     Patient reports that on 11/15 pm and 11/16 am she was having an achy left low back. Denies any injury or trauma but got a new mattress topper and it's very deep and difficult to get out of. The pain got progressively worse and then on 11/17 am, she felt it look up. She could barely sit or stand yesterday because of the pain. She took some leftover cyclobenzaprine but unknown if it helped. She has been icing and taking naprosyn with some relief. She does feel better today than she did yesterday but still experiencing a tight pain. The pain does not radiate. Denies numbness or tingling.     Review of Systems  ROS negative except as noted above in HPI.     Objective   /85   Pulse 76   Temp 36.4 °C (97.5 °F)   Wt 85.3 kg (188 lb)   SpO2 97%   BMI 32.27 kg/m²     Physical Exam  General: Alert and oriented, in no acute distress. Appears stated age, well-nourished, and well hydrated  HEENT:  - Head: Normocephalic and atraumatic   - Eyes: EOMI, PERRLA  - ENT: Hearing grossly intact  Heart: RRR. No murmurs, clicks, or rubs  Lungs: Unlabored breathing. CTAB with no crackles, wheezes, or rhonchi  Abdomen: Normal BS in all 4 quadrants. Soft, non-tender, non-distended, with no masses  Extremities: Warm and well perfused. No edema. Normal peripheral pulses  Musculoskeletal: TTP of L lumbar paraspinal musculature. Limited flexion, extension, and L lateral rotation of lumbar spine. Normal gait and station  Neurological: Alert and oriented. No gross neurological deficits  Psychological: Appropriate mood and affect  Skin: No rash, abnormal lesions, cyanosis, or erythema    Assessment/Plan   Diagnoses and all orders for this visit:  Acute left-sided low back pain without sciatica  -     cyclobenzaprine (Flexeril) 5 mg tablet; Take 1 tablet (5 mg) by mouth 3 times a day as needed for muscle spasms.  -     Continue  naproxen and ice  -     Recommend TENS unit  -     Consider medrol dose pack if no improvement  -     Consider PT if no improvement  Anxiety  -     Refilled escitalopram (Lexapro) 10 mg tablet; Take 1 tablet (10 mg) by mouth once daily    OFELIA Mcgill-CNP  [facility]

## 2025-03-18 ENCOUNTER — TELEPHONE (OUTPATIENT)
Dept: PRIMARY CARE | Facility: CLINIC | Age: 55
End: 2025-03-18
Payer: COMMERCIAL

## 2025-03-18 DIAGNOSIS — I10 PRIMARY HYPERTENSION: ICD-10-CM

## 2025-03-18 DIAGNOSIS — G45.3 AMAUROSIS FUGAX OF LEFT EYE: Primary | ICD-10-CM

## 2025-03-18 DIAGNOSIS — F41.9 ANXIETY: ICD-10-CM

## 2025-03-18 RX ORDER — ASPIRIN 81 MG/1
81 TABLET ORAL DAILY
Qty: 90 TABLET | Refills: 3 | Status: SHIPPED | OUTPATIENT
Start: 2025-03-18

## 2025-03-18 RX ORDER — ESCITALOPRAM OXALATE 10 MG/1
10 TABLET ORAL DAILY
Qty: 90 TABLET | Refills: 1 | Status: SHIPPED | OUTPATIENT
Start: 2025-03-18 | End: 2025-09-14

## 2025-03-18 RX ORDER — LOSARTAN POTASSIUM 50 MG/1
50 TABLET ORAL DAILY
Qty: 90 TABLET | Refills: 1 | Status: SHIPPED | OUTPATIENT
Start: 2025-03-18 | End: 2026-03-13

## 2025-03-18 NOTE — TELEPHONE ENCOUNTER
Janneth [phone]    Escitalopram 10mg tablet  Take 1 tablet PO NICOLAS #30    Losartan 50mg tablet   Take 1 tablet PO NICOLAS #30      Aspirin 81mg EC tablet  Take 1 tablet PO NICOLAS #30     Pt has AWV scheduled on 4/8/25

## 2025-04-02 ENCOUNTER — TELEPHONE (OUTPATIENT)
Dept: PRIMARY CARE | Facility: CLINIC | Age: 55
End: 2025-04-02
Payer: COMMERCIAL

## 2025-04-02 NOTE — TELEPHONE ENCOUNTER
Patient is scheduled for a physical on Tuesday 4/8, can you please enter lab orders and let me know when completed so I can inform patient. Thank you!!

## 2025-04-03 DIAGNOSIS — Z00.00 ENCOUNTER FOR ROUTINE HISTORY AND PHYSICAL EXAM IN FEMALE: ICD-10-CM

## 2025-04-08 ENCOUNTER — APPOINTMENT (OUTPATIENT)
Dept: PRIMARY CARE | Facility: CLINIC | Age: 55
End: 2025-04-08
Payer: COMMERCIAL

## 2025-04-08 VITALS
OXYGEN SATURATION: 96 % | HEART RATE: 81 BPM | HEIGHT: 64 IN | DIASTOLIC BLOOD PRESSURE: 78 MMHG | BODY MASS INDEX: 31.41 KG/M2 | SYSTOLIC BLOOD PRESSURE: 112 MMHG | WEIGHT: 184 LBS | TEMPERATURE: 97.3 F

## 2025-04-08 DIAGNOSIS — I10 PRIMARY HYPERTENSION: ICD-10-CM

## 2025-04-08 DIAGNOSIS — Q24.8 INTERATRIAL CARDIAC SHUNT: ICD-10-CM

## 2025-04-08 DIAGNOSIS — G45.3 AMAUROSIS FUGAX OF LEFT EYE: ICD-10-CM

## 2025-04-08 DIAGNOSIS — E78.2 MIXED HYPERLIPIDEMIA: ICD-10-CM

## 2025-04-08 DIAGNOSIS — Z00.00 ENCOUNTER FOR ROUTINE HISTORY AND PHYSICAL EXAM IN FEMALE: Primary | ICD-10-CM

## 2025-04-08 PROBLEM — K11.7 EXCESSIVE SALIVATION: Status: RESOLVED | Noted: 2023-06-23 | Resolved: 2025-04-08

## 2025-04-08 PROBLEM — F41.9 ANXIETY: Status: RESOLVED | Noted: 2023-06-23 | Resolved: 2025-04-08

## 2025-04-08 PROBLEM — K21.9 GERD (GASTROESOPHAGEAL REFLUX DISEASE): Status: RESOLVED | Noted: 2023-06-23 | Resolved: 2025-04-08

## 2025-04-08 LAB
ALBUMIN SERPL-MCNC: 4.4 G/DL (ref 3.6–5.1)
ALP SERPL-CCNC: 79 U/L (ref 37–153)
ALT SERPL-CCNC: 25 U/L (ref 6–29)
ANION GAP SERPL CALCULATED.4IONS-SCNC: 7 MMOL/L (CALC) (ref 7–17)
AST SERPL-CCNC: 18 U/L (ref 10–35)
BASOPHILS # BLD AUTO: 78 CELLS/UL (ref 0–200)
BASOPHILS NFR BLD AUTO: 1.6 %
BILIRUB SERPL-MCNC: 0.6 MG/DL (ref 0.2–1.2)
BUN SERPL-MCNC: 23 MG/DL (ref 7–25)
CALCIUM SERPL-MCNC: 9.1 MG/DL (ref 8.6–10.4)
CHLORIDE SERPL-SCNC: 105 MMOL/L (ref 98–110)
CHOLEST SERPL-MCNC: 232 MG/DL
CHOLEST/HDLC SERPL: 4.6 (CALC)
CO2 SERPL-SCNC: 26 MMOL/L (ref 20–32)
CREAT SERPL-MCNC: 0.66 MG/DL (ref 0.5–1.03)
EGFRCR SERPLBLD CKD-EPI 2021: 103 ML/MIN/1.73M2
EOSINOPHIL # BLD AUTO: 181 CELLS/UL (ref 15–500)
EOSINOPHIL NFR BLD AUTO: 3.7 %
ERYTHROCYTE [DISTWIDTH] IN BLOOD BY AUTOMATED COUNT: 12.1 % (ref 11–15)
GLUCOSE SERPL-MCNC: 88 MG/DL (ref 65–99)
HCT VFR BLD AUTO: 43.6 % (ref 35–45)
HDLC SERPL-MCNC: 50 MG/DL
HGB BLD-MCNC: 14.4 G/DL (ref 11.7–15.5)
LDLC SERPL CALC-MCNC: 156 MG/DL (CALC)
LYMPHOCYTES # BLD AUTO: 1710 CELLS/UL (ref 850–3900)
LYMPHOCYTES NFR BLD AUTO: 34.9 %
MCH RBC QN AUTO: 29.9 PG (ref 27–33)
MCHC RBC AUTO-ENTMCNC: 33 G/DL (ref 32–36)
MCV RBC AUTO: 90.6 FL (ref 80–100)
MONOCYTES # BLD AUTO: 328 CELLS/UL (ref 200–950)
MONOCYTES NFR BLD AUTO: 6.7 %
NEUTROPHILS # BLD AUTO: 2602 CELLS/UL (ref 1500–7800)
NEUTROPHILS NFR BLD AUTO: 53.1 %
NONHDLC SERPL-MCNC: 182 MG/DL (CALC)
PLATELET # BLD AUTO: 339 THOUSAND/UL (ref 140–400)
PMV BLD REES-ECKER: 9.9 FL (ref 7.5–12.5)
POTASSIUM SERPL-SCNC: 4.7 MMOL/L (ref 3.5–5.3)
PROT SERPL-MCNC: 6.9 G/DL (ref 6.1–8.1)
RBC # BLD AUTO: 4.81 MILLION/UL (ref 3.8–5.1)
SODIUM SERPL-SCNC: 138 MMOL/L (ref 135–146)
TRIGL SERPL-MCNC: 133 MG/DL
TSH SERPL-ACNC: 0.97 MIU/L (ref 0.4–4.5)
WBC # BLD AUTO: 4.9 THOUSAND/UL (ref 3.8–10.8)

## 2025-04-08 PROCEDURE — 1036F TOBACCO NON-USER: CPT | Performed by: FAMILY MEDICINE

## 2025-04-08 PROCEDURE — 93000 ELECTROCARDIOGRAM COMPLETE: CPT | Performed by: FAMILY MEDICINE

## 2025-04-08 PROCEDURE — 3008F BODY MASS INDEX DOCD: CPT | Performed by: FAMILY MEDICINE

## 2025-04-08 PROCEDURE — 99396 PREV VISIT EST AGE 40-64: CPT | Performed by: FAMILY MEDICINE

## 2025-04-08 PROCEDURE — 3078F DIAST BP <80 MM HG: CPT | Performed by: FAMILY MEDICINE

## 2025-04-08 PROCEDURE — 3074F SYST BP LT 130 MM HG: CPT | Performed by: FAMILY MEDICINE

## 2025-04-08 RX ORDER — ROSUVASTATIN CALCIUM 5 MG/1
5 TABLET, COATED ORAL DAILY
Qty: 100 TABLET | Refills: 3 | Status: SHIPPED | OUTPATIENT
Start: 2025-04-08 | End: 2026-05-13

## 2025-04-08 ASSESSMENT — ENCOUNTER SYMPTOMS
DIARRHEA: 0
SINUS PAIN: 0
FEVER: 0
EYE PAIN: 0
POLYDIPSIA: 0
PALPITATIONS: 0
FATIGUE: 0
SINUS PRESSURE: 0
ADENOPATHY: 0
HEADACHES: 0
MYALGIAS: 0
BLOOD IN STOOL: 0
EYE ITCHING: 0
DIAPHORESIS: 0
APNEA: 0
RHINORRHEA: 0
NAUSEA: 0
DIZZINESS: 0
PHOTOPHOBIA: 0
CONFUSION: 0
BRUISES/BLEEDS EASILY: 0
RESPIRATORY NEGATIVE: 1
OCCASIONAL FEELINGS OF UNSTEADINESS: 0
STRIDOR: 0
ACTIVITY CHANGE: 0
FREQUENCY: 0
SEIZURES: 0
NUMBNESS: 0
HALLUCINATIONS: 0
VOMITING: 0
JOINT SWELLING: 0
COUGH: 0
CHILLS: 0
SLEEP DISTURBANCE: 0
ABDOMINAL PAIN: 0
COLOR CHANGE: 0
SORE THROAT: 0
EYE DISCHARGE: 0
FACIAL ASYMMETRY: 0
AGITATION: 0
NERVOUS/ANXIOUS: 0
EYE REDNESS: 0
NECK PAIN: 0
CHEST TIGHTNESS: 0
BACK PAIN: 0
CONSTIPATION: 0
DEPRESSION: 0
CARDIOVASCULAR NEGATIVE: 1
ABDOMINAL DISTENTION: 0
FACIAL SWELLING: 0
DIFFICULTY URINATING: 0
LOSS OF SENSATION IN FEET: 0
TROUBLE SWALLOWING: 0
TREMORS: 0
APPETITE CHANGE: 0

## 2025-04-08 ASSESSMENT — COLUMBIA-SUICIDE SEVERITY RATING SCALE - C-SSRS
2. HAVE YOU ACTUALLY HAD ANY THOUGHTS OF KILLING YOURSELF?: NO
1. IN THE PAST MONTH, HAVE YOU WISHED YOU WERE DEAD OR WISHED YOU COULD GO TO SLEEP AND NOT WAKE UP?: NO
6. HAVE YOU EVER DONE ANYTHING, STARTED TO DO ANYTHING, OR PREPARED TO DO ANYTHING TO END YOUR LIFE?: NO

## 2025-04-08 ASSESSMENT — PATIENT HEALTH QUESTIONNAIRE - PHQ9
10. IF YOU CHECKED OFF ANY PROBLEMS, HOW DIFFICULT HAVE THESE PROBLEMS MADE IT FOR YOU TO DO YOUR WORK, TAKE CARE OF THINGS AT HOME, OR GET ALONG WITH OTHER PEOPLE: NOT DIFFICULT AT ALL
2. FEELING DOWN, DEPRESSED OR HOPELESS: NOT AT ALL
SUM OF ALL RESPONSES TO PHQ9 QUESTIONS 1 AND 2: 0
1. LITTLE INTEREST OR PLEASURE IN DOING THINGS: NOT AT ALL

## 2025-04-08 NOTE — ASSESSMENT & PLAN NOTE
Evaluated by vascular specialist.    Trying to drive down cholesterol level.  Starting rosuvastatin.

## 2025-04-08 NOTE — PROGRESS NOTES
Subjective   Patient ID: Cait Wallace is a 55 y.o. female who presents for Annual Exam.    Has PFO has been evaluated by cardiology they do not think closure was being necessary.  Patient also has a meniscal tear of her knee which is caused some pain some discomfort but orthopedics does not believe that she needs surgery    She has been really helping out her mother who has dementia    This has been taking a lot of her time is been hard for her to exercise regularly.    The patient had no difficulties with headache no double vision no blurry vision no troubles with numbness or tingling in the legs or feet.  No fever no chills no night sweats.    No troubles with numbness or tingling in the hands or feet.    Meniscal tear         Alcohol intake: 3 drinks a week  Caffeine intake: 3 cups  Exercise: walking some    Last Colonoscopy: 2021  Last Pap smear: utd  Mammogram:utd  Last Dexa scan:utd    Shingles vaccine: recommended  TdaP vaccine:     Review of Systems   Constitutional:  Negative for activity change, appetite change, chills, diaphoresis, fatigue and fever.   HENT: Negative.  Negative for congestion, dental problem, drooling, facial swelling, nosebleeds, rhinorrhea, sinus pressure, sinus pain, sneezing, sore throat, tinnitus and trouble swallowing.    Eyes:  Negative for photophobia, pain, discharge, redness, itching and visual disturbance.   Respiratory: Negative.  Negative for apnea, cough, chest tightness and stridor.    Cardiovascular: Negative.  Negative for chest pain, palpitations and leg swelling.   Gastrointestinal:  Negative for abdominal distention, abdominal pain, blood in stool, constipation, diarrhea, nausea and vomiting.   Endocrine: Negative for cold intolerance, heat intolerance, polydipsia and polyuria.   Genitourinary:  Negative for difficulty urinating, enuresis, frequency, menstrual problem, urgency and vaginal discharge.   Musculoskeletal:  Negative for back pain, joint swelling, myalgias  "and neck pain. Arthralgias: knee pain.  Skin:  Negative for color change and rash.   Allergic/Immunologic: Negative for environmental allergies and food allergies.   Neurological:  Negative for dizziness, tremors, seizures, syncope, facial asymmetry, numbness and headaches.   Hematological:  Negative for adenopathy. Does not bruise/bleed easily.   Psychiatric/Behavioral:  Negative for agitation, behavioral problems, confusion, hallucinations, sleep disturbance and suicidal ideas. The patient is not nervous/anxious.        Objective   /78   Pulse 81   Temp 36.3 °C (97.3 °F)   Ht 1.626 m (5' 4\")   Wt 83.5 kg (184 lb)   LMP 11/14/2023 (Approximate)   SpO2 96%   BMI 31.58 kg/m²   BSA Body surface area is 1.94 meters squared.      Physical Exam  Constitutional:       General: She is not in acute distress.     Appearance: Normal appearance. She is not toxic-appearing.   HENT:      Head: Normocephalic and atraumatic.      Right Ear: Tympanic membrane normal.      Left Ear: Tympanic membrane normal.      Nose: Nose normal.      Mouth/Throat:      Mouth: Mucous membranes are moist.   Eyes:      Pupils: Pupils are equal, round, and reactive to light.   Neck:      Vascular: No carotid bruit.   Cardiovascular:      Rate and Rhythm: Normal rate and regular rhythm.      Pulses: Normal pulses.   Pulmonary:      Effort: Pulmonary effort is normal.      Breath sounds: Normal breath sounds.   Abdominal:      General: Abdomen is flat.      Tenderness: There is no abdominal tenderness. There is no guarding.      Hernia: No hernia is present.   Musculoskeletal:      Cervical back: No rigidity or tenderness.      Comments: Slight crepitus noted with flexion and extension of the knees   Lymphadenopathy:      Cervical: No cervical adenopathy.   Skin:     General: Skin is warm.   Neurological:      General: No focal deficit present.      Mental Status: She is alert.   Psychiatric:         Mood and Affect: Mood normal. "       Orders Only on 04/03/2025   Component Date Value Ref Range Status    WHITE BLOOD CELL COUNT 04/07/2025 4.9  3.8 - 10.8 Thousand/uL Final    RED BLOOD CELL COUNT 04/07/2025 4.81  3.80 - 5.10 Million/uL Final    HEMOGLOBIN 04/07/2025 14.4  11.7 - 15.5 g/dL Final    HEMATOCRIT 04/07/2025 43.6  35.0 - 45.0 % Final    MCV 04/07/2025 90.6  80.0 - 100.0 fL Final    MCH 04/07/2025 29.9  27.0 - 33.0 pg Final    MCHC 04/07/2025 33.0  32.0 - 36.0 g/dL Final    Comment: For adults, a slight decrease in the calculated MCHC  value (in the range of 30 to 32 g/dL) is most likely  not clinically significant; however, it should be  interpreted with caution in correlation with other  red cell parameters and the patient's clinical  condition.      RDW 04/07/2025 12.1  11.0 - 15.0 % Final    PLATELET COUNT 04/07/2025 339  140 - 400 Thousand/uL Final    MPV 04/07/2025 9.9  7.5 - 12.5 fL Final    ABSOLUTE NEUTROPHILS 04/07/2025 2,602  1,500 - 7,800 cells/uL Final    ABSOLUTE LYMPHOCYTES 04/07/2025 1,710  850 - 3,900 cells/uL Final    ABSOLUTE MONOCYTES 04/07/2025 328  200 - 950 cells/uL Final    ABSOLUTE EOSINOPHILS 04/07/2025 181  15 - 500 cells/uL Final    ABSOLUTE BASOPHILS 04/07/2025 78  0 - 200 cells/uL Final    NEUTROPHILS 04/07/2025 53.1  % Final    LYMPHOCYTES 04/07/2025 34.9  % Final    MONOCYTES 04/07/2025 6.7  % Final    EOSINOPHILS 04/07/2025 3.7  % Final    BASOPHILS 04/07/2025 1.6  % Final    GLUCOSE 04/07/2025 88  65 - 99 mg/dL Final    Comment:               Fasting reference interval         UREA NITROGEN (BUN) 04/07/2025 23  7 - 25 mg/dL Final    CREATININE 04/07/2025 0.66  0.50 - 1.03 mg/dL Final    EGFR 04/07/2025 103  > OR = 60 mL/min/1.73m2 Final    SODIUM 04/07/2025 138  135 - 146 mmol/L Final    POTASSIUM 04/07/2025 4.7  3.5 - 5.3 mmol/L Final    CHLORIDE 04/07/2025 105  98 - 110 mmol/L Final    CARBON DIOXIDE 04/07/2025 26  20 - 32 mmol/L Final    ELECTROLYTE BALANCE 04/07/2025 7  7 - 17 mmol/L (calc) Final     CALCIUM 04/07/2025 9.1  8.6 - 10.4 mg/dL Final    PROTEIN, TOTAL 04/07/2025 6.9  6.1 - 8.1 g/dL Final    ALBUMIN 04/07/2025 4.4  3.6 - 5.1 g/dL Final    BILIRUBIN, TOTAL 04/07/2025 0.6  0.2 - 1.2 mg/dL Final    ALKALINE PHOSPHATASE 04/07/2025 79  37 - 153 U/L Final    AST 04/07/2025 18  10 - 35 U/L Final    ALT 04/07/2025 25  6 - 29 U/L Final    CHOLESTEROL, TOTAL 04/07/2025 232 (H)  <200 mg/dL Final    HDL CHOLESTEROL 04/07/2025 50  > OR = 50 mg/dL Final    TRIGLYCERIDES 04/07/2025 133  <150 mg/dL Final    LDL-CHOLESTEROL 04/07/2025 156 (H)  mg/dL (calc) Final    Comment: Reference range: <100     Desirable range <100 mg/dL for primary prevention;    <70 mg/dL for patients with CHD or diabetic patients   with > or = 2 CHD risk factors.     LDL-C is now calculated using the Marshal-Krishnamurthy   calculation, which is a validated novel method providing   better accuracy than the Friedewald equation in the   estimation of LDL-C.   Marshal SS et al. OPAL. 2013;310(19): 6493-4011   (http://education.[employer]/faq/TGE070)      CHOL/HDLC RATIO 04/07/2025 4.6  <5.0 (calc) Final    NON HDL CHOLESTEROL 04/07/2025 182 (H)  <130 mg/dL (calc) Final    Comment: For patients with diabetes plus 1 major ASCVD risk   factor, treating to a non-HDL-C goal of <100 mg/dL   (LDL-C of <70 mg/dL) is considered a therapeutic   option.      TSH W/REFLEX TO FT4 04/07/2025 0.97  0.40 - 4.50 mIU/L Final     Current Outpatient Medications on File Prior to Visit   Medication Sig Dispense Refill    aspirin 81 mg EC tablet Take 1 tablet (81 mg) by mouth once daily. 90 tablet 3    cholecalciferol (Vitamin D-3) 25 MCG (1000 UT) tablet Take 1 tablet (25 mcg) by mouth once daily.      cyclobenzaprine (Flexeril) 5 mg tablet Take 1 tablet (5 mg) by mouth 3 times a day as needed for muscle spasms. 20 tablet 0    escitalopram (Lexapro) 10 mg tablet Take 1 tablet (10 mg) by mouth once daily. Patient needs follow-up appointment to be seen 90 tablet 1     losartan (Cozaar) 50 mg tablet Take 1 tablet (50 mg) by mouth once daily. 90 tablet 1     No current facility-administered medications on file prior to visit.     No images are attached to the encounter.            Assessment/Plan   Problem List Items Addressed This Visit             ICD-10-CM    Primary hypertension I10    Relevant Orders    Comprehensive metabolic panel    Lipid panel    Sedimentation Rate    C-reactive protein    CK    Encounter for routine history and physical exam in female - Primary Z00.00    Amaurosis fugax of left eye G45.3     Evaluated by vascular specialist.    Trying to drive down cholesterol level.  Starting rosuvastatin.         Mixed hyperlipidemia E78.2     Cholesterol was not at goal starting rosuvastatin therapy         Relevant Medications    rosuvastatin (Crestor) 5 mg tablet    Other Relevant Orders    ECG 12 Lead (Completed)    Comprehensive metabolic panel    Lipid panel    Sedimentation Rate    C-reactive protein    CK    Interatrial cardiac shunt Q24.8     followed by vascular specialist.  They do not believe intervention is necessary

## 2025-04-08 NOTE — PATIENT INSTRUCTIONS
I have recommended doing shingles vaccination given information today.    Labs show persistent elevation of cholesterol going to start rosuvastatin therapy going to check lipids CMP sed rate C-reactive protein in 4 weeks.    If any difficulties with muscle aches discomfort or nausea with the medicine please call and let me know.    Medications reviewed and reconciled    Labs have been reviewed with you today.    Blood pressure is well-controlled    EKG performed and reviewed no acute abnormalities noted

## 2025-05-22 ENCOUNTER — OFFICE VISIT (OUTPATIENT)
Dept: PRIMARY CARE | Facility: CLINIC | Age: 55
End: 2025-05-22
Payer: COMMERCIAL

## 2025-05-22 VITALS
DIASTOLIC BLOOD PRESSURE: 82 MMHG | HEIGHT: 64 IN | WEIGHT: 179 LBS | BODY MASS INDEX: 30.56 KG/M2 | SYSTOLIC BLOOD PRESSURE: 132 MMHG | HEART RATE: 88 BPM | OXYGEN SATURATION: 99 % | TEMPERATURE: 98 F

## 2025-05-22 DIAGNOSIS — S29.019A ACUTE THORACIC MYOFASCIAL STRAIN, INITIAL ENCOUNTER: ICD-10-CM

## 2025-05-22 DIAGNOSIS — M54.2 CERVICALGIA: Primary | ICD-10-CM

## 2025-05-22 PROCEDURE — 3008F BODY MASS INDEX DOCD: CPT | Performed by: FAMILY MEDICINE

## 2025-05-22 PROCEDURE — 3079F DIAST BP 80-89 MM HG: CPT | Performed by: FAMILY MEDICINE

## 2025-05-22 PROCEDURE — 99213 OFFICE O/P EST LOW 20 MIN: CPT | Performed by: FAMILY MEDICINE

## 2025-05-22 PROCEDURE — 3075F SYST BP GE 130 - 139MM HG: CPT | Performed by: FAMILY MEDICINE

## 2025-05-22 RX ORDER — CYCLOBENZAPRINE HCL 10 MG
10 TABLET ORAL NIGHTLY PRN
Qty: 30 TABLET | Refills: 0 | Status: SHIPPED | OUTPATIENT
Start: 2025-05-22 | End: 2025-07-21

## 2025-05-22 RX ORDER — METHYLPREDNISOLONE 4 MG/1
TABLET ORAL
Qty: 21 TABLET | Refills: 0 | Status: SHIPPED | OUTPATIENT
Start: 2025-05-22 | End: 2025-05-28

## 2025-05-22 ASSESSMENT — ENCOUNTER SYMPTOMS
RESPIRATORY NEGATIVE: 1
LOSS OF SENSATION IN FEET: 0
DEPRESSION: 0
PALPITATIONS: 0
SPEECH DIFFICULTY: 0
HEADACHES: 0
OCCASIONAL FEELINGS OF UNSTEADINESS: 0
WEAKNESS: 0
UNEXPECTED WEIGHT CHANGE: 0
DIAPHORESIS: 0
APPETITE CHANGE: 0
FEVER: 0
WOUND: 0
DECREASED CONCENTRATION: 0
NECK PAIN: 1
CARDIOVASCULAR NEGATIVE: 1

## 2025-05-22 NOTE — PROGRESS NOTES
"Subjective   Patient ID: Cait Wallace is a 55 y.o. female who presents for Back Pain (neck pain which radiates down on the shoulder and arm).    Patient started over the last 3 days with a lot of neck pain and shoulder pain and discomfort has been sleeping on the floor at her mother's house who has history of dementia.    States is difficult to move her neck in any way possible and also the upper back area.    Feels spasm going to the neck with deep inspiration.    No numbness no tingling into the hands she had tried 5 mg of cyclobenzaprine some heat and ice therapy and gentle stretching with little relief.    She has felt some crackling and cracking of the neck    No numbness no tingling in the hands     patient with pain into the neck .    Some flexeril.  Heat and ice.  Into the L shoulder.    Hard to fleex.    Hurts with rotation.          Review of Systems   Constitutional:  Negative for appetite change, diaphoresis, fever and unexpected weight change.   HENT: Negative.  Negative for congestion and ear discharge.    Respiratory: Negative.     Cardiovascular: Negative.  Negative for chest pain, palpitations and leg swelling.   Musculoskeletal:  Positive for neck pain. Negative for gait problem.   Skin:  Negative for wound.   Neurological:  Negative for speech difficulty, weakness and headaches.   Psychiatric/Behavioral:  Negative for decreased concentration.        Objective   /82   Pulse 88   Temp 36.7 °C (98 °F)   Ht 1.626 m (5' 4\")   Wt 81.2 kg (179 lb)   LMP 11/14/2023 (Approximate)   SpO2 99%   BMI 30.73 kg/m²   BSA Body surface area is 1.91 meters squared.      Physical Exam  Constitutional:       General: She is not in acute distress.     Appearance: Normal appearance.      Comments: Guarding against neck motion   HENT:      Head: Normocephalic and atraumatic.      Right Ear: Ear canal normal.      Left Ear: Ear canal normal.   Cardiovascular:      Rate and Rhythm: Normal rate and regular " rhythm.      Pulses: Normal pulses.   Pulmonary:      Effort: No respiratory distress.      Breath sounds: No wheezing.   Neurological:      General: No focal deficit present.      Mental Status: She is alert.      Cranial Nerves: No cranial nerve deficit.      Sensory: No sensory deficit.      Motor: Weakness present.     Patient guarding neck motion.  Some tenderness in the musculature of the left side of the cervical spine from C3-C7 right side reveals slight tenderness.  Pain elicited with hyperextension and with rotation.  Some tenderness noted into the area of the thoracic spine as well some pain with flexing forward and with hyperextension as well  Orders Only on 04/03/2025   Component Date Value Ref Range Status    WHITE BLOOD CELL COUNT 04/07/2025 4.9  3.8 - 10.8 Thousand/uL Final    RED BLOOD CELL COUNT 04/07/2025 4.81  3.80 - 5.10 Million/uL Final    HEMOGLOBIN 04/07/2025 14.4  11.7 - 15.5 g/dL Final    HEMATOCRIT 04/07/2025 43.6  35.0 - 45.0 % Final    MCV 04/07/2025 90.6  80.0 - 100.0 fL Final    MCH 04/07/2025 29.9  27.0 - 33.0 pg Final    MCHC 04/07/2025 33.0  32.0 - 36.0 g/dL Final    Comment: For adults, a slight decrease in the calculated MCHC  value (in the range of 30 to 32 g/dL) is most likely  not clinically significant; however, it should be  interpreted with caution in correlation with other  red cell parameters and the patient's clinical  condition.      RDW 04/07/2025 12.1  11.0 - 15.0 % Final    PLATELET COUNT 04/07/2025 339  140 - 400 Thousand/uL Final    MPV 04/07/2025 9.9  7.5 - 12.5 fL Final    ABSOLUTE NEUTROPHILS 04/07/2025 2,602  1,500 - 7,800 cells/uL Final    ABSOLUTE LYMPHOCYTES 04/07/2025 1,710  850 - 3,900 cells/uL Final    ABSOLUTE MONOCYTES 04/07/2025 328  200 - 950 cells/uL Final    ABSOLUTE EOSINOPHILS 04/07/2025 181  15 - 500 cells/uL Final    ABSOLUTE BASOPHILS 04/07/2025 78  0 - 200 cells/uL Final    NEUTROPHILS 04/07/2025 53.1  % Final    LYMPHOCYTES 04/07/2025 34.9  %  Final    MONOCYTES 04/07/2025 6.7  % Final    EOSINOPHILS 04/07/2025 3.7  % Final    BASOPHILS 04/07/2025 1.6  % Final    GLUCOSE 04/07/2025 88  65 - 99 mg/dL Final    Comment:               Fasting reference interval         UREA NITROGEN (BUN) 04/07/2025 23  7 - 25 mg/dL Final    CREATININE 04/07/2025 0.66  0.50 - 1.03 mg/dL Final    EGFR 04/07/2025 103  > OR = 60 mL/min/1.73m2 Final    SODIUM 04/07/2025 138  135 - 146 mmol/L Final    POTASSIUM 04/07/2025 4.7  3.5 - 5.3 mmol/L Final    CHLORIDE 04/07/2025 105  98 - 110 mmol/L Final    CARBON DIOXIDE 04/07/2025 26  20 - 32 mmol/L Final    ELECTROLYTE BALANCE 04/07/2025 7  7 - 17 mmol/L (calc) Final    CALCIUM 04/07/2025 9.1  8.6 - 10.4 mg/dL Final    PROTEIN, TOTAL 04/07/2025 6.9  6.1 - 8.1 g/dL Final    ALBUMIN 04/07/2025 4.4  3.6 - 5.1 g/dL Final    BILIRUBIN, TOTAL 04/07/2025 0.6  0.2 - 1.2 mg/dL Final    ALKALINE PHOSPHATASE 04/07/2025 79  37 - 153 U/L Final    AST 04/07/2025 18  10 - 35 U/L Final    ALT 04/07/2025 25  6 - 29 U/L Final    CHOLESTEROL, TOTAL 04/07/2025 232 (H)  <200 mg/dL Final    HDL CHOLESTEROL 04/07/2025 50  > OR = 50 mg/dL Final    TRIGLYCERIDES 04/07/2025 133  <150 mg/dL Final    LDL-CHOLESTEROL 04/07/2025 156 (H)  mg/dL (calc) Final    Comment: Reference range: <100     Desirable range <100 mg/dL for primary prevention;    <70 mg/dL for patients with CHD or diabetic patients   with > or = 2 CHD risk factors.     LDL-C is now calculated using the Marshal-Raghu   calculation, which is a validated novel method providing   better accuracy than the Friedewald equation in the   estimation of LDL-C.   Marshal MANJARREZ et al. OPAL. 2013;310(19): 3212-0254   (http://education.[employer]/faq/PEZ378)      CHOL/HDLC RATIO 04/07/2025 4.6  <5.0 (calc) Final    NON HDL CHOLESTEROL 04/07/2025 182 (H)  <130 mg/dL (calc) Final    Comment: For patients with diabetes plus 1 major ASCVD risk   factor, treating to a non-HDL-C goal of <100 mg/dL   (LDL-C of  <70 mg/dL) is considered a therapeutic   option.      TSH W/REFLEX TO FT4 04/07/2025 0.97  0.40 - 4.50 mIU/L Final     Medications Ordered Prior to Encounter[1]  No images are attached to the encounter.            Assessment/Plan   Problem List Items Addressed This Visit           ICD-10-CM    Cervicalgia - Primary M54.2    X-ray has been performed.    Medrol Dosepak.  Please alternate heat and ice         Acute thoracic myofascial strain S29.019A    X-rays being performed.  Starting physical therapy and Medrol Dosepak                      [1]   Current Outpatient Medications on File Prior to Visit   Medication Sig Dispense Refill    aspirin 81 mg EC tablet Take 1 tablet (81 mg) by mouth once daily. 90 tablet 3    cholecalciferol (Vitamin D-3) 25 MCG (1000 UT) tablet Take 1 tablet (25 mcg) by mouth once daily.      cyclobenzaprine (Flexeril) 5 mg tablet Take 1 tablet (5 mg) by mouth 3 times a day as needed for muscle spasms. 20 tablet 0    escitalopram (Lexapro) 10 mg tablet Take 1 tablet (10 mg) by mouth once daily. Patient needs follow-up appointment to be seen 90 tablet 1    losartan (Cozaar) 50 mg tablet Take 1 tablet (50 mg) by mouth once daily. 90 tablet 1    rosuvastatin (Crestor) 5 mg tablet Take 1 tablet (5 mg) by mouth once daily. 100 tablet 3     No current facility-administered medications on file prior to visit.

## 2025-05-22 NOTE — PATIENT INSTRUCTIONS
X-rays of the cervical spine and thoracic spine being performed regarding muscle relaxer is noted we will start Medrol Dosepak as noted if any worsening of pain or discomfort please call and let us know.    Please call in the next 3 to 4 days to let us know how you are doing.    Do not drive if medication makes you drowsy    Do not take ibuprofen with the steroid

## 2025-05-23 ENCOUNTER — HOSPITAL ENCOUNTER (OUTPATIENT)
Dept: RADIOLOGY | Facility: CLINIC | Age: 55
Discharge: HOME | End: 2025-05-23
Payer: COMMERCIAL

## 2025-05-23 DIAGNOSIS — M54.2 CERVICALGIA: ICD-10-CM

## 2025-05-23 DIAGNOSIS — S29.019A ACUTE THORACIC MYOFASCIAL STRAIN, INITIAL ENCOUNTER: ICD-10-CM

## 2025-05-23 PROCEDURE — 72072 X-RAY EXAM THORAC SPINE 3VWS: CPT

## 2025-05-23 PROCEDURE — 72050 X-RAY EXAM NECK SPINE 4/5VWS: CPT

## 2025-06-10 ENCOUNTER — APPOINTMENT (OUTPATIENT)
Dept: PRIMARY CARE | Facility: CLINIC | Age: 55
End: 2025-06-10
Payer: COMMERCIAL

## 2025-06-10 VITALS
SYSTOLIC BLOOD PRESSURE: 118 MMHG | TEMPERATURE: 97.9 F | HEIGHT: 64 IN | WEIGHT: 178 LBS | OXYGEN SATURATION: 96 % | HEART RATE: 82 BPM | BODY MASS INDEX: 30.39 KG/M2 | DIASTOLIC BLOOD PRESSURE: 72 MMHG

## 2025-06-10 DIAGNOSIS — M54.2 CERVICALGIA: Primary | ICD-10-CM

## 2025-06-10 PROCEDURE — 99213 OFFICE O/P EST LOW 20 MIN: CPT | Performed by: FAMILY MEDICINE

## 2025-06-10 PROCEDURE — 3078F DIAST BP <80 MM HG: CPT | Performed by: FAMILY MEDICINE

## 2025-06-10 PROCEDURE — 3074F SYST BP LT 130 MM HG: CPT | Performed by: FAMILY MEDICINE

## 2025-06-10 PROCEDURE — 3008F BODY MASS INDEX DOCD: CPT | Performed by: FAMILY MEDICINE

## 2025-06-10 RX ORDER — METHYLPREDNISOLONE 4 MG/1
TABLET ORAL
Qty: 21 TABLET | Refills: 0 | Status: SHIPPED | OUTPATIENT
Start: 2025-06-10 | End: 2025-06-16

## 2025-06-10 ASSESSMENT — ENCOUNTER SYMPTOMS
CARDIOVASCULAR NEGATIVE: 1
OCCASIONAL FEELINGS OF UNSTEADINESS: 0
NECK PAIN: 1
LOSS OF SENSATION IN FEET: 0
APPETITE CHANGE: 0
RESPIRATORY NEGATIVE: 1
BACK PAIN: 1
FEVER: 0
DEPRESSION: 0

## 2025-06-10 ASSESSMENT — PATIENT HEALTH QUESTIONNAIRE - PHQ9
2. FEELING DOWN, DEPRESSED OR HOPELESS: NOT AT ALL
1. LITTLE INTEREST OR PLEASURE IN DOING THINGS: NOT AT ALL
SUM OF ALL RESPONSES TO PHQ9 QUESTIONS 1 AND 2: 0

## 2025-06-10 NOTE — PATIENT INSTRUCTIONS
Repeating Medrol Dosepak at this time please go to physical therapy as appointments have already been made would like to know how you are doing with this in the next 1 week, and when you start physical therapy.    If any worsening of discomfort or numbness or tingling in hands or legs or feet please call and let me know.    Overall significant improvement noted    Reviewed x-ray reports with you today that showed degenerative change of the cervical spine and also some foraminal narrowing

## 2025-06-10 NOTE — ASSESSMENT & PLAN NOTE
Repeating Medrol Dosepak.  This did make a difference.    Please follow-up with physical therapy as noted    Reviewed x-ray report

## 2025-06-10 NOTE — PROGRESS NOTES
"Subjective   Patient ID: Cait Wallace is a 55 y.o. female who presents for Follow-up (xray).    Pain worse with hyper extension.  Patient having some neck discomfort is worse with hyperextension flexion is intact x-ray shows degenerative changes with some foraminal narrowing.    No numbness no tingling into the hands or arms.  No numbness no tingling into the feet patient has not been off balance she is much improved but still had quite a bit of discomfort 2 nights ago.  She has upcoming physical therapy next week.    Patient significant relief with Medrol.         Review of Systems   Constitutional:  Negative for appetite change and fever.   Respiratory: Negative.     Cardiovascular: Negative.    Musculoskeletal:  Positive for back pain and neck pain.       Objective   /72   Pulse 82   Temp 36.6 °C (97.9 °F)   Ht 1.626 m (5' 4\")   Wt 80.7 kg (178 lb)   LMP 11/14/2023 (Approximate)   SpO2 96%   BMI 30.55 kg/m²   BSA Body surface area is 1.91 meters squared.      Physical Exam  HENT:      Head: Normocephalic and atraumatic.   Cardiovascular:      Rate and Rhythm: Normal rate and regular rhythm.   Pulmonary:      Effort: Pulmonary effort is normal.      Breath sounds: Normal breath sounds.   Abdominal:      General: There is no distension.   Skin:     Coloration: Skin is not jaundiced.   Neurological:      General: No focal deficit present.      Mental Status: She is alert and oriented to person, place, and time.      Motor: No weakness.   Psychiatric:         Mood and Affect: Mood normal.     Some tenderness in the musculature of along the cervical spine both right and left side from the C5-C7 level.  Some pain with full flexion forward and with hyperextension    Strong shoulder shrug noted strong handgrip strength noted D10 reflexes are equal and strong  Orders Only on 04/03/2025   Component Date Value Ref Range Status    WHITE BLOOD CELL COUNT 04/07/2025 4.9  3.8 - 10.8 Thousand/uL Final    RED BLOOD " CELL COUNT 04/07/2025 4.81  3.80 - 5.10 Million/uL Final    HEMOGLOBIN 04/07/2025 14.4  11.7 - 15.5 g/dL Final    HEMATOCRIT 04/07/2025 43.6  35.0 - 45.0 % Final    MCV 04/07/2025 90.6  80.0 - 100.0 fL Final    MCH 04/07/2025 29.9  27.0 - 33.0 pg Final    MCHC 04/07/2025 33.0  32.0 - 36.0 g/dL Final    Comment: For adults, a slight decrease in the calculated MCHC  value (in the range of 30 to 32 g/dL) is most likely  not clinically significant; however, it should be  interpreted with caution in correlation with other  red cell parameters and the patient's clinical  condition.      RDW 04/07/2025 12.1  11.0 - 15.0 % Final    PLATELET COUNT 04/07/2025 339  140 - 400 Thousand/uL Final    MPV 04/07/2025 9.9  7.5 - 12.5 fL Final    ABSOLUTE NEUTROPHILS 04/07/2025 2,602  1,500 - 7,800 cells/uL Final    ABSOLUTE LYMPHOCYTES 04/07/2025 1,710  850 - 3,900 cells/uL Final    ABSOLUTE MONOCYTES 04/07/2025 328  200 - 950 cells/uL Final    ABSOLUTE EOSINOPHILS 04/07/2025 181  15 - 500 cells/uL Final    ABSOLUTE BASOPHILS 04/07/2025 78  0 - 200 cells/uL Final    NEUTROPHILS 04/07/2025 53.1  % Final    LYMPHOCYTES 04/07/2025 34.9  % Final    MONOCYTES 04/07/2025 6.7  % Final    EOSINOPHILS 04/07/2025 3.7  % Final    BASOPHILS 04/07/2025 1.6  % Final    GLUCOSE 04/07/2025 88  65 - 99 mg/dL Final    Comment:               Fasting reference interval         UREA NITROGEN (BUN) 04/07/2025 23  7 - 25 mg/dL Final    CREATININE 04/07/2025 0.66  0.50 - 1.03 mg/dL Final    EGFR 04/07/2025 103  > OR = 60 mL/min/1.73m2 Final    SODIUM 04/07/2025 138  135 - 146 mmol/L Final    POTASSIUM 04/07/2025 4.7  3.5 - 5.3 mmol/L Final    CHLORIDE 04/07/2025 105  98 - 110 mmol/L Final    CARBON DIOXIDE 04/07/2025 26  20 - 32 mmol/L Final    ELECTROLYTE BALANCE 04/07/2025 7  7 - 17 mmol/L (calc) Final    CALCIUM 04/07/2025 9.1  8.6 - 10.4 mg/dL Final    PROTEIN, TOTAL 04/07/2025 6.9  6.1 - 8.1 g/dL Final    ALBUMIN 04/07/2025 4.4  3.6 - 5.1 g/dL Final     BILIRUBIN, TOTAL 04/07/2025 0.6  0.2 - 1.2 mg/dL Final    ALKALINE PHOSPHATASE 04/07/2025 79  37 - 153 U/L Final    AST 04/07/2025 18  10 - 35 U/L Final    ALT 04/07/2025 25  6 - 29 U/L Final    CHOLESTEROL, TOTAL 04/07/2025 232 (H)  <200 mg/dL Final    HDL CHOLESTEROL 04/07/2025 50  > OR = 50 mg/dL Final    TRIGLYCERIDES 04/07/2025 133  <150 mg/dL Final    LDL-CHOLESTEROL 04/07/2025 156 (H)  mg/dL (calc) Final    Comment: Reference range: <100     Desirable range <100 mg/dL for primary prevention;    <70 mg/dL for patients with CHD or diabetic patients   with > or = 2 CHD risk factors.     LDL-C is now calculated using the [employer]   calculation, which is a validated novel method providing   better accuracy than the Friedewald equation in the   estimation of LDL-C.   Marshal SS et al. OPAL. 2013;310(19): 0084-4534   (http://education.ClickingHouse/faq/EJR700)      CHOL/HDLC RATIO 04/07/2025 4.6  <5.0 (calc) Final    NON HDL CHOLESTEROL 04/07/2025 182 (H)  <130 mg/dL (calc) Final    Comment: For patients with diabetes plus 1 major ASCVD risk   factor, treating to a non-HDL-C goal of <100 mg/dL   (LDL-C of <70 mg/dL) is considered a therapeutic   option.      TSH W/REFLEX TO FT4 04/07/2025 0.97  0.40 - 4.50 mIU/L Final     Medications Ordered Prior to Encounter[1]  No images are attached to the encounter.            Assessment/Plan   Problem List Items Addressed This Visit           ICD-10-CM    Cervicalgia - Primary M54.2    Repeating Medrol Dosepak.  This did make a difference.    Please follow-up with physical therapy as noted    Reviewed x-ray report         Relevant Medications    methylPREDNISolone (Medrol Dospak) 4 mg tablets               [1]   Current Outpatient Medications on File Prior to Visit   Medication Sig Dispense Refill    aspirin 81 mg EC tablet Take 1 tablet (81 mg) by mouth once daily. 90 tablet 3    cholecalciferol (Vitamin D-3) 25 MCG (1000 UT) tablet Take 1 tablet (25 mcg) by mouth  once daily.      cyclobenzaprine (Flexeril) 10 mg tablet Take 1 tablet (10 mg) by mouth as needed at bedtime for muscle spasms. 30 tablet 0    escitalopram (Lexapro) 10 mg tablet Take 1 tablet (10 mg) by mouth once daily. Patient needs follow-up appointment to be seen 90 tablet 1    losartan (Cozaar) 50 mg tablet Take 1 tablet (50 mg) by mouth once daily. 90 tablet 1    rosuvastatin (Crestor) 5 mg tablet Take 1 tablet (5 mg) by mouth once daily. 100 tablet 3     No current facility-administered medications on file prior to visit.

## 2025-06-11 LAB
ALBUMIN SERPL-MCNC: 4.4 G/DL (ref 3.6–5.1)
ALP SERPL-CCNC: 93 U/L (ref 37–153)
ALT SERPL-CCNC: 35 U/L (ref 6–29)
ANION GAP SERPL CALCULATED.4IONS-SCNC: 9 MMOL/L (CALC) (ref 7–17)
AST SERPL-CCNC: 21 U/L (ref 10–35)
BILIRUB SERPL-MCNC: 0.7 MG/DL (ref 0.2–1.2)
BUN SERPL-MCNC: 14 MG/DL (ref 7–25)
CALCIUM SERPL-MCNC: 9.5 MG/DL (ref 8.6–10.4)
CHLORIDE SERPL-SCNC: 104 MMOL/L (ref 98–110)
CHOLEST SERPL-MCNC: 174 MG/DL
CHOLEST/HDLC SERPL: 3.5 (CALC)
CK SERPL-CCNC: 67 U/L (ref 21–240)
CO2 SERPL-SCNC: 27 MMOL/L (ref 20–32)
CREAT SERPL-MCNC: 0.76 MG/DL (ref 0.5–1.03)
CRP SERPL-MCNC: 7.8 MG/L
EGFRCR SERPLBLD CKD-EPI 2021: 92 ML/MIN/1.73M2
ERYTHROCYTE [SEDIMENTATION RATE] IN BLOOD BY WESTERGREN METHOD: 17 MM/H
GLUCOSE SERPL-MCNC: 81 MG/DL (ref 65–99)
HDLC SERPL-MCNC: 50 MG/DL
LDLC SERPL CALC-MCNC: 104 MG/DL (CALC)
NONHDLC SERPL-MCNC: 124 MG/DL (CALC)
POTASSIUM SERPL-SCNC: 4.2 MMOL/L (ref 3.5–5.3)
PROT SERPL-MCNC: 6.9 G/DL (ref 6.1–8.1)
SODIUM SERPL-SCNC: 140 MMOL/L (ref 135–146)
TRIGL SERPL-MCNC: 108 MG/DL

## 2025-06-12 DIAGNOSIS — R79.89 ELEVATED LIVER FUNCTION TESTS: ICD-10-CM

## 2025-06-12 DIAGNOSIS — E78.2 MIXED HYPERLIPIDEMIA: ICD-10-CM

## 2025-06-17 NOTE — PROGRESS NOTES
Physical Therapy    Physical Therapy Cervical Spine Evaluation    Patient Name: Cait Wallace  MRN: [id number]  Today's Date: 2025  Time Calculation  Start Time: 1701  Stop Time: 1743  Time Calculation (min): 42 min  PT Evaluation Time Entry  PT Evaluation (Low) Time Entry: 32  PT Therapeutic Procedures Time Entry  Therapeutic Exercise Time Entry: 10                 Payor: [employer]ARE / Plan: SUMMACARE / Product Type: *No Product type* /     Reason for Referral: Cervcial radic  General Comment: Visit 1 of 30    Current Problem  Problem List Items Addressed This Visit           ICD-10-CM    Cervicalgia M54.2    Relevant Orders    Follow Up In Physical Therapy    Bilateral cervical radiculopathy - Primary M54.12    Relevant Orders    Follow Up In Physical Therapy        Precautions  Precautions  Precautions Comment: None       Pain  Pain Assessment: 0-10  0-10 (Numeric) Pain Score: 2  Pain at worst: 10/10    SUBJECTIVE:   Pain location: cervical, intermittent radic into bilateral hands/all fingers. She does correlate her hand symptoms with her neck pain. Neck pain is more L sided down to L scapular region     Medrol dose eri x 2. Yesterday was the last pill     Onset: about 2 months ago.   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    +N/T  -HA/ringing in ear/blurred vision/nausea/vomiting    Reviewed medical hx form      Aggravating factors:  Looking down, cervical movement in any direction     Alleviating factors:  Steroid     Imagin25 XR Cervical   FINDINGS:  Cervical spine demonstrates fairly advanced C4 through C7 discogenic  degenerative disease with bilateral neural foraminal narrowing.  Mild scoliosis of the cervical and thoracic spine.  No evidence of fracture.  Thoracic spine demonstrates moderate diffuse degenerative change.      IMPRESSION:  Fairly advanced multilevel cervical  degenerative change.  Moderate thoracic degenerative changes.  Mild scoliosis    Prior level of function:   Previously independent with all functional activity     Functional limitations:      Home setup:  Reviewed and no concern     Work:  Nurse supervisor     Patient stated goal:  Learn stretches to avoid surgery     Prior tx:  None     OBJECTIVE:      Upper extremity ROM: (WNL unless documented below)(p=pain)     Upper Extremity Strength: (WNL unless documented below) (p=pain)   RIGHT LEFT   Shoulder Flexion 4+ 4+   Shoulder Abduction 4+ 4+   Shoulder ER 4+ 4+   Shoulder IR 5 5   Elbow Flexion 5 5   Elbow Extension 4+ 4+     Cervical ROM: (WNL unless documented below)   Flexion WNL, strain in neck, right of center    Extension Min loss., NE   Protraction WNL, NE    Retraction Min loss, strain across neck     RIGHT LEFT   Side bend 50% loss, strain on R  75% loss, NE    Rotation 50% loss, NE 50% loss    +cervical crepitus with testing    Liya Cervical Repeated Movement Testing:   Pretest Symptoms  2/10 L scapular    Rep Protraction     Rep Retraction  NE   Rep Retraction with pt overpressure  Centralized to L side of neck, NB        Myotomes: (WNL unless documented below)     Dermatomes: (WNL unless documented below)     Outcome Measure:  Other Measures  Neck Disability Index: 10    TREATMENT:  Initial evaluation completed. Issued and reviewed HEP with pt that included:  Access Code: [email or web address]  URL: [email or web address]  Date: 06/18/2025  Prepared by: Marlin Seymour    Exercises  - Seated Correct Posture  - 7 x weekly  - Seated Cervical Retraction  - 7 x weekly - 10 reps - 2 seconds hold - 2-3 hours frequency  - Seated Passive Cervical Retraction  - 7 x weekly - 10 reps - every 2-3 hours  frequency  - Standing Cervical Retraction with Sidebending  - 1 x daily - 7 x weekly - 3 reps - 20 seconds  hold  - Standing Shoulder Row with Anchored Resistance  - 1 x daily - 7 x weekly - 2-3 sets -  10 reps  - Shoulder External Rotation and Scapular Retraction with Resistance  - 1 x daily - 7 x weekly - 2-3 sets - 10 reps    Patient Education  - Sleep Positions  - Ergonomics for Neck Discomfort    Pt educated on the importance of monitoring symptoms for centralization and peripheralization with all activity and exercises.       ASSESSEMENT  The pt presents with signs and symptoms consistent with the Physical Therapy diagnosis of cervical pain and radiculopathy into bilateral hands, L scapular region.   Pt demonstrates decreased cervical ROM. She responded well to retraction which centralized her symptoms. The pt will benefit from skilled physical therapy to reduce impairments in order to return to prior level of function, reduce pain, increase strength and ROM and overall improve posture.    The physical therapy prognosis is good for the patient to achieve their goals.   The pt tolerated therapy treatment today well with no adverse effects.    Personal factors/barriers to learning that impact therapy include:  None  Clinical presentation: Stable and/or uncomplicated characteristics  Level of clinical decision making is Low    PLAN  The pt will be seen 1 time(s) a week for 4 weeks.      The pt has been educated about the risks and benefits of physical therapy including manual therapy treatments and gives consent for treatment.     The patient will benefit from physical therapy treatment to include: therapeutic exercises, therapeutic activities, neuromuscular re-education, manual therapy, modalities, and a home exercise program.     Goals:  Active       PT Problem       Reduce pain at worst to 0-3/10 with all functional and recreational activity.        Start:  06/18/25    Expected End:  09/16/25            Increase by > or = 25% without increased pain to perform dressing/bathing/grooming tasks and other functional tasks         Start:  06/18/25    Expected End:  09/16/25            Increase by > or = 1/2 mm  grade to improve postural awareness and body mechanics to perform daily tasks without increased pain/compensation          Start:  06/18/25    Expected End:  09/16/25            Sit with proper posture > or = 15 minutes without VC's or increased pain to drive, work, read         Start:  06/18/25    Expected End:  09/16/25            Pt to have NDI score decreased by 10% to display increased overall function.        Start:  06/18/25    Expected End:  09/16/25            Patient will demonstrate independence in home program for support of progression       Start:  06/18/25    Expected End:  09/16/25

## 2025-06-18 ENCOUNTER — EVALUATION (OUTPATIENT)
Dept: PHYSICAL THERAPY | Facility: CLINIC | Age: 55
End: 2025-06-18
Payer: COMMERCIAL

## 2025-06-18 DIAGNOSIS — M54.2 CERVICALGIA: ICD-10-CM

## 2025-06-18 DIAGNOSIS — M54.12 BILATERAL CERVICAL RADICULOPATHY: Primary | ICD-10-CM

## 2025-06-18 PROCEDURE — 97110 THERAPEUTIC EXERCISES: CPT | Mod: GP | Performed by: PHYSICAL THERAPIST

## 2025-06-18 PROCEDURE — 97161 PT EVAL LOW COMPLEX 20 MIN: CPT | Mod: GP | Performed by: PHYSICAL THERAPIST

## 2025-06-18 ASSESSMENT — PAIN - FUNCTIONAL ASSESSMENT: PAIN_FUNCTIONAL_ASSESSMENT: 0-10

## 2025-06-18 ASSESSMENT — PAIN SCALES - GENERAL: PAINLEVEL_OUTOF10: 2

## 2025-06-25 ENCOUNTER — TREATMENT (OUTPATIENT)
Dept: PHYSICAL THERAPY | Facility: CLINIC | Age: 55
End: 2025-06-25
Payer: COMMERCIAL

## 2025-06-25 DIAGNOSIS — M54.12 BILATERAL CERVICAL RADICULOPATHY: ICD-10-CM

## 2025-06-25 DIAGNOSIS — M54.2 CERVICALGIA: ICD-10-CM

## 2025-06-25 PROCEDURE — 97140 MANUAL THERAPY 1/> REGIONS: CPT | Mod: GP,CQ

## 2025-06-25 PROCEDURE — 97110 THERAPEUTIC EXERCISES: CPT | Mod: GP,CQ

## 2025-06-25 ASSESSMENT — PAIN SCALES - GENERAL: PAINLEVEL_OUTOF10: 3

## 2025-06-25 ASSESSMENT — PAIN - FUNCTIONAL ASSESSMENT: PAIN_FUNCTIONAL_ASSESSMENT: 0-10

## 2025-06-25 NOTE — PROGRESS NOTES
Physical Therapy Treatment    Patient Name: Cait Wallace  MRN: [id number]  Today's Date: 6/25/2025  Visit #2  Time Calculation  Start Time: 1447  Stop Time: 1530  Time Calculation (min): 43 min     PT Therapeutic Procedures Time Entry  Therapeutic Exercise Time Entry: 30  Manual Therapy Time Entry: 13                 Payor: [employer]ARE / Plan: [employer]ARE / Product Type: *No Product type* /   Steve Salgado  Reason for Referral: Cervcial radic  General Comment: Visit 2 of 30      Current Problem:  Problem List Items Addressed This Visit           ICD-10-CM    Cervicalgia M54.2    Bilateral cervical radiculopathy M54.12       Subjective   General:  cervical, intermittent radic into bilateral hands/all fingers. She does correlate her hand symptoms with her neck pain. Neck pain is more L sided down to L scapular region     Medrol dose eri x 2. Yesterday was the last pill     Pt reports overall feeling better since performing HEP. Still experiencing numbness in L UE without change but R seems to be better.  Shoulder blade pain was feeling better but has come up again from sleeping on floor.  Pt reports some improvement in R UE, L numbness persists.    HEP Compliance:     Patient stated goal: Learn stretches to avoid surgery     Pain:  Pain Assessment: 0-10  0-10 (Numeric) Pain Score: 3  Pain Location: Neck  Pain Orientation: Left  Pain Radiating Towards: shoulder blade      Precautions:  Precautions  Precautions Comment: None      Objective   No objective measures taken this visit    Treatment:  Therapeutic exercise  Cervical retractions 2 x10, PRN- better in L   Cervical retractions with ext x10 - better   Pull downs YTB 2x10  3 way posture iso 10 sec x3     Neuromuscular Re-education       Manual     Informed consent given on manual treatment. Pt given opportunity to cease treatment at any time. Educated pt on expected soreness, possible ecchymosis. Pt voiced understanding  No adverse effects were noted post tx.       Modalities      HEP:  Access Code: [email or web address]  URL: [email or web address]  Date: 06/18/2025  Prepared by: Marlin Seymour    Exercises  - Seated Correct Posture  - 7 x weekly  - Seated Cervical Retraction  - 7 x weekly - 10 reps - 2 seconds hold - 2-3 hours frequency  - Seated Passive Cervical Retraction  - 7 x weekly - 10 reps - every 2-3 hours  frequency  - Standing Cervical Retraction with Sidebending  - 1 x daily - 7 x weekly - 3 reps - 20 seconds  hold  - Standing Shoulder Row with Anchored Resistance  - 1 x daily - 7 x weekly - 2-3 sets - 10 reps  - Shoulder External Rotation and Scapular Retraction with Resistance  - 1 x daily - 7 x weekly - 2-3 sets - 10 reps    Patient Education  - Sleep Positions  - Ergonomics for Neck Discomfort    Pt educated on the importance of monitoring symptoms for centralization and peripheralization with all activity and exercises.   Assessment   Pt responds well to cervical retractions with/without ext with decreased radicular symptoms. Instructed pt to increase number of sets with goal of centralization of UE symptoms.  Pt did well with exercises with good awareness to upright posture. TTP  along R>L with manual tx. Positive response to manual tx with pt voicing relief following and post tx. Discussed expected soreness post tx and advised pt to ice if needed.     Plan    Continue to progress POC as tolerated by patient to improve strength, mobility and overall function  Monitor progress from HEP.    Goals:  Active       PT Problem       Reduce pain at worst to 0-3/10 with all functional and recreational activity.        Start:  06/18/25    Expected End:  09/16/25            Increase by > or = 25% without increased pain to perform dressing/bathing/grooming tasks and other functional tasks         Start:  06/18/25    Expected End:  09/16/25            Increase by > or = 1/2 mm grade to improve postural awareness and body mechanics to perform daily tasks  without increased pain/compensation          Start:  06/18/25    Expected End:  09/16/25            Sit with proper posture > or = 15 minutes without VC's or increased pain to drive, work, read         Start:  06/18/25    Expected End:  09/16/25            Pt to have NDI score decreased by 10% to display increased overall function.        Start:  06/18/25    Expected End:  09/16/25            Patient will demonstrate independence in home program for support of progression       Start:  06/18/25    Expected End:  09/16/25

## 2025-06-30 NOTE — PROGRESS NOTES
Physical Therapy Treatment    Patient Name: Cait Wallace  MRN: [id number]  Today's Date: 7/1/2025  Visit #3  Time Calculation  Start Time: 0836  Stop Time: 0916  Time Calculation (min): 40 min     PT Therapeutic Procedures Time Entry  Therapeutic Exercise Time Entry: 25  Manual Therapy Time Entry: 15                 Payor: SUMMACARE / Plan: SUMMACARE / Product Type: *No Product type* /   Steve Salgado  Reason for Referral: Cervcial radic  General Comment: Visit 3 of 30      Current Problem:  Problem List Items Addressed This Visit           ICD-10-CM    Cervicalgia M54.2    Bilateral cervical radiculopathy M54.12         Subjective   General:  Reports that she thinks she only had one incident of numbness since her last visit. Notes stiffness in neck.     HEP Compliance: Yes     Patient stated goal: Learn stretches to avoid surgery     Pain:  Pain Assessment: 0-10  0-10 (Numeric) Pain Score: 3      Precautions:  Precautions  Precautions Comment: None      Objective   No objective measures taken this visit    Treatment:  Therapeutic exercise  UBE 2'/2'  Cervical retractions 2 x10  Cervical retractions with ext/snags x 10   Thoracic extension/pec stretch over 1/2 foam 2x10, 3 seconds  Pull downs OTB 2x10  Lat pulldowns GTB 2x10    3 way posture iso 10 sec x3       Manual   STMto bilateral UT region   Cervical traction/occipital release     Informed consent given on manual treatment. Pt given opportunity to cease treatment at any time. Educated pt on expected soreness, possible ecchymosis. Pt voiced understanding  No adverse effects were noted post tx.      Modalities      HEP:  Access Code: [email or web address]  URL: [email or web address]  Date: 06/18/2025  Prepared by: Marlin Seymour    Exercises  - Seated Correct Posture  - 7 x weekly  - Seated Cervical Retraction  - 7 x weekly - 10 reps - 2 seconds hold - 2-3 hours frequency  - Seated Passive Cervical Retraction  - 7 x weekly - 10 reps - every 2-3 hours   frequency  - Standing Cervical Retraction with Sidebending  - 1 x daily - 7 x weekly - 3 reps - 20 seconds  hold  - Standing Shoulder Row with Anchored Resistance  - 1 x daily - 7 x weekly - 2-3 sets - 10 reps  - Shoulder External Rotation and Scapular Retraction with Resistance  - 1 x daily - 7 x weekly - 2-3 sets - 10 reps    Patient Education  - Sleep Positions  - Ergonomics for Neck Discomfort    Pt educated on the importance of monitoring symptoms for centralization and peripheralization with all activity and exercises.     Assessment  Pt did well with exercise progressions today. Tolerated manual tx with Trigger points in R>L UT. Reported feeling good after manual tx.     Plan  Continue to progress POC as tolerated by patient to improve strength, mobility and overall function      Goals:  Active       PT Problem       Reduce pain at worst to 0-3/10 with all functional and recreational activity.        Start:  06/18/25    Expected End:  09/16/25            Increase by > or = 25% without increased pain to perform dressing/bathing/grooming tasks and other functional tasks         Start:  06/18/25    Expected End:  09/16/25            Increase by > or = 1/2 mm grade to improve postural awareness and body mechanics to perform daily tasks without increased pain/compensation          Start:  06/18/25    Expected End:  09/16/25            Sit with proper posture > or = 15 minutes without VC's or increased pain to drive, work, read         Start:  06/18/25    Expected End:  09/16/25            Pt to have NDI score decreased by 10% to display increased overall function.        Start:  06/18/25    Expected End:  09/16/25            Patient will demonstrate independence in home program for support of progression       Start:  06/18/25    Expected End:  09/16/25

## 2025-07-01 ENCOUNTER — TREATMENT (OUTPATIENT)
Dept: PHYSICAL THERAPY | Facility: CLINIC | Age: 55
End: 2025-07-01
Payer: COMMERCIAL

## 2025-07-01 DIAGNOSIS — M54.12 BILATERAL CERVICAL RADICULOPATHY: ICD-10-CM

## 2025-07-01 DIAGNOSIS — M54.2 CERVICALGIA: ICD-10-CM

## 2025-07-01 PROCEDURE — 97140 MANUAL THERAPY 1/> REGIONS: CPT | Mod: GP | Performed by: PHYSICAL THERAPIST

## 2025-07-01 PROCEDURE — 97110 THERAPEUTIC EXERCISES: CPT | Mod: GP | Performed by: PHYSICAL THERAPIST

## 2025-07-01 ASSESSMENT — PAIN SCALES - GENERAL: PAINLEVEL_OUTOF10: 3

## 2025-07-01 ASSESSMENT — PAIN - FUNCTIONAL ASSESSMENT: PAIN_FUNCTIONAL_ASSESSMENT: 0-10

## 2025-07-09 ENCOUNTER — TREATMENT (OUTPATIENT)
Dept: PHYSICAL THERAPY | Facility: CLINIC | Age: 55
End: 2025-07-09
Payer: COMMERCIAL

## 2025-07-09 DIAGNOSIS — M54.2 CERVICALGIA: ICD-10-CM

## 2025-07-09 DIAGNOSIS — M54.12 BILATERAL CERVICAL RADICULOPATHY: Primary | ICD-10-CM

## 2025-07-09 PROCEDURE — 97110 THERAPEUTIC EXERCISES: CPT | Mod: GP,CQ

## 2025-07-09 PROCEDURE — 97140 MANUAL THERAPY 1/> REGIONS: CPT | Mod: GP,CQ

## 2025-07-09 ASSESSMENT — PAIN SCALES - GENERAL: PAINLEVEL_OUTOF10: 1

## 2025-07-09 ASSESSMENT — PAIN - FUNCTIONAL ASSESSMENT: PAIN_FUNCTIONAL_ASSESSMENT: 0-10

## 2025-07-09 NOTE — PROGRESS NOTES
Physical Therapy Treatment    Patient Name: Cait Wallace  MRN: [id number]  Today's Date: 7/9/2025  Visit #4                          Payor: [employer] / Plan: [employer] / Product Type: *No Product type* /   Steve Salgado         Current Problem:  Problem List Items Addressed This Visit    None          Subjective   General:  Reports that she thinks she only had one incident of numbness since her last visit. Notes stiffness in neck.     HEP Compliance: Yes     Patient stated goal: Learn stretches to avoid surgery     Pain:         Precautions:         Objective   No objective measures taken this visit    Treatment:  Therapeutic exercise  UBE 2'/2'  Cervical retractions 2 x10  Cervical retractions with ext/snags x 10   Thoracic extension/pec stretch over 1/2 foam 2x10, 3 seconds  Pull downs OTB 2x10  Lat pulldowns GTB 2x10    3 way posture iso 10 sec x3       Manual   STMto bilateral UT region   Cervical traction/occipital release     Informed consent given on manual treatment. Pt given opportunity to cease treatment at any time. Educated pt on expected soreness, possible ecchymosis. Pt voiced understanding  No adverse effects were noted post tx.      Modalities      HEP:  Access Code: [email or web address]  URL: [email or web address]  Date: 06/18/2025  Prepared by: Marlin Seymour    Exercises  - Seated Correct Posture  - 7 x weekly  - Seated Cervical Retraction  - 7 x weekly - 10 reps - 2 seconds hold - 2-3 hours frequency  - Seated Passive Cervical Retraction  - 7 x weekly - 10 reps - every 2-3 hours  frequency  - Standing Cervical Retraction with Sidebending  - 1 x daily - 7 x weekly - 3 reps - 20 seconds  hold  - Standing Shoulder Row with Anchored Resistance  - 1 x daily - 7 x weekly - 2-3 sets - 10 reps  - Shoulder External Rotation and Scapular Retraction with Resistance  - 1 x daily - 7 x weekly - 2-3 sets - 10 reps    Patient Education  - Sleep Positions  - Ergonomics for Neck Discomfort    Pt  educated on the importance of monitoring symptoms for centralization and peripheralization with all activity and exercises.     Assessment  Pt did well with exercise progressions today. Tolerated manual tx with Trigger points in R>L UT. Reported feeling good after manual tx.     Plan  Continue to progress POC as tolerated by patient to improve strength, mobility and overall function      Goals:  Active       PT Problem       Reduce pain at worst to 0-3/10 with all functional and recreational activity.        Start:  06/18/25    Expected End:  09/16/25            Increase by > or = 25% without increased pain to perform dressing/bathing/grooming tasks and other functional tasks         Start:  06/18/25    Expected End:  09/16/25            Increase by > or = 1/2 mm grade to improve postural awareness and body mechanics to perform daily tasks without increased pain/compensation          Start:  06/18/25    Expected End:  09/16/25            Sit with proper posture > or = 15 minutes without VC's or increased pain to drive, work, read         Start:  06/18/25    Expected End:  09/16/25            Pt to have NDI score decreased by 10% to display increased overall function.        Start:  06/18/25    Expected End:  09/16/25            Patient will demonstrate independence in home program for support of progression       Start:  06/18/25    Expected End:  09/16/25

## 2025-07-09 NOTE — PROGRESS NOTES
Physical Therapy Treatment    Patient Name: Cait Wallace  MRN: [id number]  Today's Date: 7/9/2025  Visit #4  Time Calculation  Start Time: 1318  Stop Time: 1403  Time Calculation (min): 45 min     PT Therapeutic Procedures Time Entry  Therapeutic Exercise Time Entry: 32  Manual Therapy Time Entry: 13                 [facility] / Plan: [employer] / Product Type: *No Product type* /   Steve Salgado  Reason for Referral: Cervcial radic  General Comment: Visit 4 of 30      Current Problem:  Problem List Items Addressed This Visit           ICD-10-CM    Cervicalgia M54.2    Bilateral cervical radiculopathy - Primary M54.12       Subjective   General:  Pt reports she has not had any symptoms in UE's recently.   Neck is stiff from working 6 days in a row.   Feeling some discomfort in shoulder blade    HEP Compliance: Yes   Patient stated goal: Learn stretches to avoid surgery     Pain:  Pain Assessment: 0-10  0-10 (Numeric) Pain Score: 1  Pain Location: Neck  Pain Orientation: Left  Pain Radiating Towards: shoulder blade      Precautions:  Precautions  Precautions Comment: None      Objective   No objective measures taken this visit    Treatment:  Therapeutic exercise  UBE 2.5'/2.5'  Cervical retractions 2 x10  Cervical retractions with ext/snags x 10   Thoracic extension/pec stretch over 1/2 foam 2x10, 3 seconds  Pull downs OTB 2x10  Lat pulldowns GTB 2x10    3 way posture iso 10 sec x3   Wall alice iso 10 sec x3      Manual   STMto bilateral UT region   Cervical traction/occipital release     Informed consent given on manual treatment. Pt given opportunity to cease treatment at any time. Educated pt on expected soreness, possible ecchymosis. Pt voiced understanding  No adverse effects were noted post tx.      Modalities  Will ice at home    HEP:  Access Code: [email or web address]  URL: [email or web address]  Date: 06/18/2025  Prepared by: Marlin Seymour    Exercises  - Seated Correct Posture  - 7 x  weekly  - Seated Cervical Retraction  - 7 x weekly - 10 reps - 2 seconds hold - 2-3 hours frequency  - Seated Passive Cervical Retraction  - 7 x weekly - 10 reps - every 2-3 hours  frequency  - Standing Cervical Retraction with Sidebending  - 1 x daily - 7 x weekly - 3 reps - 20 seconds  hold  - Standing Shoulder Row with Anchored Resistance  - 1 x daily - 7 x weekly - 2-3 sets - 10 reps  - Shoulder External Rotation and Scapular Retraction with Resistance  - 1 x daily - 7 x weekly - 2-3 sets - 10 reps    Patient Education  - Sleep Positions  - Ergonomics for Neck Discomfort    Pt educated on the importance of monitoring symptoms for centralization and peripheralization with all activity and exercises.     Assessment  Overall responding well to retractions, exercises with less radicular symptoms in UE's.   Decreased pain in shoulder blade following retractions.  Required cueing to decrease UT elevation and to utilize appropriate scap musculature during exercises.   Continues to respond well to manual tx.  Discussed ergonomics, paying attention to UT elevation to decrease tension in neck throughout the day.    Plan  Continue to progress POC as tolerated by patient to improve strength, mobility and overall function      Goals:  Active       PT Problem       Reduce pain at worst to 0-3/10 with all functional and recreational activity.        Start:  06/18/25    Expected End:  09/16/25            Increase by > or = 25% without increased pain to perform dressing/bathing/grooming tasks and other functional tasks         Start:  06/18/25    Expected End:  09/16/25            Increase by > or = 1/2 mm grade to improve postural awareness and body mechanics to perform daily tasks without increased pain/compensation          Start:  06/18/25    Expected End:  09/16/25            Sit with proper posture > or = 15 minutes without VC's or increased pain to drive, work, read         Start:  06/18/25    Expected End:  09/16/25             Pt to have NDI score decreased by 10% to display increased overall function.        Start:  06/18/25    Expected End:  09/16/25            Patient will demonstrate independence in home program for support of progression       Start:  06/18/25    Expected End:  09/16/25

## 2025-07-17 ENCOUNTER — APPOINTMENT (OUTPATIENT)
Dept: PHYSICAL THERAPY | Facility: CLINIC | Age: 55
End: 2025-07-17
Payer: COMMERCIAL

## 2025-07-28 NOTE — PROGRESS NOTES
Physical Therapy Treatment    Patient Name: Cait Wallace  MRN: [id number]  Today's Date: 7/29/2025  Visit #5  Time Calculation  Start Time: 1400  Stop Time: 1446  Time Calculation (min): 46 min     PT Therapeutic Procedures Time Entry  Therapeutic Exercise Time Entry: 31  Manual Therapy Time Entry: 15                 [facility] / Plan: [employer] / Product Type: *No Product type* /   Steve Salgado  Reason for Referral: Cervcial radic  General Comment: Visit 5 of 30      Current Problem:  Problem List Items Addressed This Visit           ICD-10-CM    Cervicalgia M54.2    Bilateral cervical radiculopathy M54.12         Subjective   General:  Pt reports that she was doing well for a couple of weeks and then at work on Sunday she experienced a lot of L sided thoracic pain. Her  had to help her out of bed.   L rotation causes knife like pain.    She cannot recall what exacerbated her symptoms   Took flexeril and it helped.   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   Pain reached a 10/10 on Sunday.   Currently 3-4/10 in thoracic region on L  No pain cervical spine    HEP Compliance: Yes   Patient stated goal: Learn stretches to avoid surgery     Pain:  Pain Assessment: 0-10  0-10 (Numeric) Pain Score: 0 - No pain      Precautions:  Precautions  Precautions Comment: None      Objective   Min loss with thoracic rotation R  Mod loss with thoracic rotation L    Treatment:  Therapeutic exercise  *Recheck next visit   UBE 2.5'/2.5'  Cervical retractions 2 x10  Cervical retractions with ext/snags x 10   Thoracic extension/pec stretch over 1/2 foam 2x10, 3 seconds HEP   Thoracic rotation L 2x10  Horizontal abduction with retraction YTB 2x10  Pull downs OTB 2x10  Lat pulldowns GTB 2x10  Wall alice iso 10 sec x3 discontinued       Manual   STM to bilateral UT region   Cervical traction/occipital release     Informed consent given on manual treatment. Pt given opportunity to cease treatment at any time. Educated  pt on expected soreness, possible ecchymosis. Pt voiced understanding  No adverse effects were noted post tx.      Modalities  Will ice at home    HEP:  Access Code: [email or web address]  URL: [email or web address]  Date: 07/29/2025  Prepared by: Marlin Seymour    Exercises  - Seated Correct Posture  - 7 x weekly  - Seated Cervical Retraction  - 7 x weekly - 10 reps - 2 seconds hold - 2-3 hours frequency  - Seated Passive Cervical Retraction  - 7 x weekly - 10 reps - every 2-3 hours  frequency  - Standing Cervical Retraction with Sidebending  - 1 x daily - 7 x weekly - 3 reps - 20 seconds  hold  - Seated Thoracic Lumbar Extension with Pectoralis Stretch  - 1 x daily - 7 x weekly - 2 sets - 10 reps - 3 seconds hold  - Seated Thoracic Rotation: Open Book  - 1 x daily - 7 x weekly - 1-2 sets - 10 reps  - Standing Shoulder Row with Anchored Resistance  - 1 x daily - 3-4 x weekly - 2-3 sets - 10 reps  - Scapular Retraction with Resistance Advanced  - 1 x daily - 3-4 x weekly - 3 sets - 10 reps  - Shoulder External Rotation and Scapular Retraction with Resistance  - 1 x daily - 3-4 x weekly - 2-3 sets - 10 reps  - Seated Shoulder Horizontal Abduction with Resistance - Thumbs Up  - 1 x daily - 3-4 x weekly - 2-3 sets - 10 reps    Patient Education  - Sleep Positions  - Ergonomics for Neck Discomfort    Assessment  Pt did well with exercise progressions today. Continues to have trigger points in bilateral UT region. Will recheck next visit to determine need for additional PT    Plan  Continue to progress POC as tolerated by patient to improve strength, mobility and overall function      Goals:  Active       PT Problem       Reduce pain at worst to 0-3/10 with all functional and recreational activity.        Start:  06/18/25    Expected End:  09/16/25            Increase by > or = 25% without increased pain to perform dressing/bathing/grooming tasks and other functional tasks         Start:  06/18/25    Expected  End:  09/16/25            Increase by > or = 1/2 mm grade to improve postural awareness and body mechanics to perform daily tasks without increased pain/compensation          Start:  06/18/25    Expected End:  09/16/25            Sit with proper posture > or = 15 minutes without VC's or increased pain to drive, work, read         Start:  06/18/25    Expected End:  09/16/25            Pt to have NDI score decreased by 10% to display increased overall function.        Start:  06/18/25    Expected End:  09/16/25            Patient will demonstrate independence in home program for support of progression       Start:  06/18/25    Expected End:  09/16/25

## 2025-07-29 ENCOUNTER — TREATMENT (OUTPATIENT)
Dept: PHYSICAL THERAPY | Facility: CLINIC | Age: 55
End: 2025-07-29
Payer: COMMERCIAL

## 2025-07-29 DIAGNOSIS — M54.2 CERVICALGIA: ICD-10-CM

## 2025-07-29 DIAGNOSIS — M54.12 BILATERAL CERVICAL RADICULOPATHY: ICD-10-CM

## 2025-07-29 PROCEDURE — 97140 MANUAL THERAPY 1/> REGIONS: CPT | Mod: GP | Performed by: PHYSICAL THERAPIST

## 2025-07-29 PROCEDURE — 97110 THERAPEUTIC EXERCISES: CPT | Mod: GP | Performed by: PHYSICAL THERAPIST

## 2025-07-29 ASSESSMENT — PAIN SCALES - GENERAL: PAINLEVEL_OUTOF10: 0 - NO PAIN

## 2025-07-29 ASSESSMENT — PAIN - FUNCTIONAL ASSESSMENT: PAIN_FUNCTIONAL_ASSESSMENT: 0-10
